# Patient Record
Sex: FEMALE | Race: WHITE
[De-identification: names, ages, dates, MRNs, and addresses within clinical notes are randomized per-mention and may not be internally consistent; named-entity substitution may affect disease eponyms.]

---

## 2019-11-14 ENCOUNTER — HOSPITAL ENCOUNTER (INPATIENT)
Dept: HOSPITAL 74 - JER | Age: 84
LOS: 11 days | Discharge: HOME | DRG: 441 | End: 2019-11-25
Attending: INTERNAL MEDICINE | Admitting: INTERNAL MEDICINE
Payer: COMMERCIAL

## 2019-11-14 VITALS — BODY MASS INDEX: 31.6 KG/M2

## 2019-11-14 DIAGNOSIS — I44.7: ICD-10-CM

## 2019-11-14 DIAGNOSIS — R94.31: ICD-10-CM

## 2019-11-14 DIAGNOSIS — F32.9: ICD-10-CM

## 2019-11-14 DIAGNOSIS — E83.39: ICD-10-CM

## 2019-11-14 DIAGNOSIS — R50.9: ICD-10-CM

## 2019-11-14 DIAGNOSIS — R65.10: ICD-10-CM

## 2019-11-14 DIAGNOSIS — K75.89: Primary | ICD-10-CM

## 2019-11-14 DIAGNOSIS — N17.9: ICD-10-CM

## 2019-11-14 DIAGNOSIS — K76.1: ICD-10-CM

## 2019-11-14 DIAGNOSIS — R74.8: ICD-10-CM

## 2019-11-14 DIAGNOSIS — T43.225A: ICD-10-CM

## 2019-11-14 DIAGNOSIS — I50.21: ICD-10-CM

## 2019-11-14 DIAGNOSIS — E87.2: ICD-10-CM

## 2019-11-14 DIAGNOSIS — K80.20: ICD-10-CM

## 2019-11-14 DIAGNOSIS — R76.8: ICD-10-CM

## 2019-11-14 DIAGNOSIS — E78.5: ICD-10-CM

## 2019-11-14 DIAGNOSIS — I11.0: ICD-10-CM

## 2019-11-14 DIAGNOSIS — B17.9: ICD-10-CM

## 2019-11-14 DIAGNOSIS — I07.1: ICD-10-CM

## 2019-11-14 DIAGNOSIS — S36.118A: ICD-10-CM

## 2019-11-14 DIAGNOSIS — R00.0: ICD-10-CM

## 2019-11-14 DIAGNOSIS — E87.1: ICD-10-CM

## 2019-11-14 DIAGNOSIS — K59.00: ICD-10-CM

## 2019-11-14 DIAGNOSIS — R74.0: ICD-10-CM

## 2019-11-14 LAB
ALBUMIN SERPL-MCNC: 3.5 G/DL (ref 3.4–5)
ALBUMIN SERPL-MCNC: 3.6 G/DL (ref 3.4–5)
ALP SERPL-CCNC: 84 U/L (ref 45–117)
ALP SERPL-CCNC: 86 U/L (ref 45–117)
ALT SERPL-CCNC: > 1000 U/L (ref 13–61)
ANION GAP SERPL CALC-SCNC: 12 MMOL/L (ref 8–16)
ANION GAP SERPL CALC-SCNC: 9 MMOL/L (ref 8–16)
APPEARANCE UR: CLEAR
AST SERPL-CCNC: 3495 U/L (ref 15–37)
AST SERPL-CCNC: 3595 U/L (ref 15–37)
BACTERIA # UR AUTO: 4.3 /HPF
BASOPHILS # BLD: 0.6 % (ref 0–2)
BILIRUB SERPL-MCNC: 1 MG/DL (ref 0.2–1)
BILIRUB SERPL-MCNC: 1.1 MG/DL (ref 0.2–1)
BILIRUB UR STRIP.AUTO-MCNC: NEGATIVE MG/DL
BUN SERPL-MCNC: 13.7 MG/DL (ref 7–18)
BUN SERPL-MCNC: 13.9 MG/DL (ref 7–18)
CALCIUM SERPL-MCNC: 8.8 MG/DL (ref 8.5–10.1)
CALCIUM SERPL-MCNC: 8.9 MG/DL (ref 8.5–10.1)
CASTS URNS QL MICRO: 6 /LPF (ref 0–8)
CHLORIDE SERPL-SCNC: 100 MMOL/L (ref 98–107)
CHLORIDE SERPL-SCNC: 99 MMOL/L (ref 98–107)
CO2 SERPL-SCNC: 21 MMOL/L (ref 21–32)
CO2 SERPL-SCNC: 24 MMOL/L (ref 21–32)
COLOR UR: (no result)
CREAT SERPL-MCNC: 1.2 MG/DL (ref 0.55–1.3)
CREAT SERPL-MCNC: 1.3 MG/DL (ref 0.55–1.3)
DEPRECATED RDW RBC AUTO: 14.3 % (ref 11.6–15.6)
EOSINOPHIL # BLD: 0.1 % (ref 0–4.5)
EPITH CASTS URNS QL MICRO: 5.7 /HPF
GLUCOSE SERPL-MCNC: 271 MG/DL (ref 74–106)
GLUCOSE SERPL-MCNC: 284 MG/DL (ref 74–106)
HCT VFR BLD CALC: 38.9 % (ref 32.4–45.2)
HGB BLD-MCNC: 12.5 GM/DL (ref 10.7–15.3)
INR BLD: 1.69 (ref 0.83–1.09)
KETONES UR QL STRIP: (no result)
LEUKOCYTE ESTERASE UR QL STRIP.AUTO: NEGATIVE
LIPASE SERPL-CCNC: 73 U/L (ref 73–393)
LYMPHOCYTES # BLD: 7.8 % (ref 8–40)
MAGNESIUM SERPL-MCNC: 1.9 MG/DL (ref 1.8–2.4)
MCH RBC QN AUTO: 26.8 PG (ref 25.7–33.7)
MCHC RBC AUTO-ENTMCNC: 32.1 G/DL (ref 32–36)
MCV RBC: 83.7 FL (ref 80–96)
MONOCYTES # BLD AUTO: 5.7 % (ref 3.8–10.2)
NEUTROPHILS # BLD: 85.8 % (ref 42.8–82.8)
NITRITE UR QL STRIP: NEGATIVE
PH UR: 5 [PH] (ref 5–8)
PHOSPHATE SERPL-MCNC: 2.1 MG/DL (ref 2.5–4.9)
PLATELET # BLD AUTO: 284 K/MM3 (ref 134–434)
PMV BLD: 8.4 FL (ref 7.5–11.1)
POTASSIUM SERPLBLD-SCNC: 4.2 MMOL/L (ref 3.5–5.1)
POTASSIUM SERPLBLD-SCNC: 4.8 MMOL/L (ref 3.5–5.1)
PROT SERPL-MCNC: 6.8 G/DL (ref 6.4–8.2)
PROT SERPL-MCNC: 7.1 G/DL (ref 6.4–8.2)
PROT UR QL STRIP: (no result)
PROT UR QL STRIP: (no result)
PT PNL PPP: 20 SEC (ref 9.7–13)
RBC # BLD AUTO: 2 /HPF (ref 0–4)
RBC # BLD AUTO: 4.65 M/MM3 (ref 3.6–5.2)
SODIUM SERPL-SCNC: 133 MMOL/L (ref 136–145)
SODIUM SERPL-SCNC: 133 MMOL/L (ref 136–145)
SP GR UR: 1.03 (ref 1.01–1.03)
UROBILINOGEN UR STRIP-MCNC: 1 MG/DL (ref 0.2–1)
WBC # BLD AUTO: 12.2 K/MM3 (ref 4–10)
WBC # UR AUTO: 2 /HPF (ref 0–5)

## 2019-11-14 PROCEDURE — A9502 TC99M TETROFOSMIN: HCPCS

## 2019-11-14 PROCEDURE — C1887 CATHETER, GUIDING: HCPCS

## 2019-11-14 NOTE — CON.GI
Consult


Consult Specialty:: GI: For Dr. Dupree who resumes care 11/15


Referred by:: Dr. Feliz Verde


Reason for Consultation:: Abnormal LFTs





- History of Present Illness


Chief Complaint: Weakness for 2 weeks and increasing HUTSON


History of Present Illness: 





88F admitted through [facility] ER for evaluation of weakness and SOB.  This has been 

occurring over the last 2 weeks and accompanied by increasing HUTSON. She was 

given amoxicillin monday (took 3-4 pills since that time) thinking that this 

may be infection related and may have taken two tylenol pills daily for 2-3 

days for attempt at symptomatic relief this week.  In the ED, transaminases 

noted to be markedly elevated. CXR revealed pleural effusions.  She denied 

associated abdominal pain, diarrhea, change in dietary habits, sick contacts, 

headache, change in vision, fevers/chills, nausea/vomiting, food fear, travel.  

Her daughter explains that her mother has done some yard work with soil that 

had crushed up moth balls added to it.  There has been no known animal 

exposures.   





- History Source


History Provided By: Patient, Family Member, Medical Record


Limitations to Obtaining History: No Limitations





- Past Medical History


Cardio/Vascular: Yes: HTN, Hyperlipdemia





- Past Surgical History


Past Surgical History: Yes: Hysterectomy (ROXANA/BSO)





- Alcohol/Substance Use


Hx Alcohol Use: No


History of Substance Use: reports: None





- Smoking History


Smoking history: Never smoked


Have you smoked in the past 12 months: No





- Social History


Usual Living Arrangement: With Child


ADL: Independent


Occupation: Retired 


Place of Birth: [place]


History of Recent Travel: No





Home Medications





- Allergies


Allergies/Adverse Reactions: 


 Allergies











Allergy/AdvReac Type Severity Reaction Status Date / Time


 


metoclopramide [From Reglan] Allergy   Verified 19 11:40


 


prochlorperazine Allergy   Verified 19 11:39





[From Compazine]     














- Home Medications


Home Medications: 


Ambulatory Orders





Amoxicillin - [Amoxicillin 500mg Capsule -] 500 mg PO TID 19 


Aspirin [Children's Aspirin] 81 mg PO DAILY 19 


Fenofibrate Nanocrystallized [Fenofibrate] 145 mg PO DAILY 19 


Losartan Potassium 50 mg PO DAILY 19 


Lovastatin 20 mg PO DAILY 19 


Paroxetine HCl 40 mg PO DAILY 19 


Zolpidem Tartrate 10 mg PO DAILY 19 











Family Medical History


Other Family History: Father: : 70's: Asthma.  Mother: : 70's: "heart 

problems".  2 brothers: : unclear causes.  2 daughters: healthy.  No family 

history of colorectal cancer or other GI malignancy.  No family history of 

liver disease





Review of Systems





- Review of Systems


Constitutional: reports: Weakness.  denies: Chills, Unintentional Wgt. Loss


Cardiovascular: denies: Chest Pain


Respiratory: reports: SOB, SOB on Exertion


Gastrointestinal: denies: Abdominal Pain, Bloating, Diarrhea, Melena, Rectal 

Bleeding





Physical Exam-GI


Vital Signs: 


 Vital Signs











Temperature  100.6 F H  19 17:00


 


Pulse Rate  98 H  19 17:00


 


Respiratory Rate  22 H  19 17:00


 


Blood Pressure  147/90   19 17:00


 


O2 Sat by Pulse Oximetry (%)  99   19 17:00











Constitutional: Yes: Calm


Eyes: No: Sclera Icterus


Cardiovascular: Yes: Regular Rate and Rhythm, Gallop, S3


Edema: LLE: Trace


Neurological: Yes: Alert, Oriented.  No: Asterixis


Labs: 


 CBC, BMP





 19 12:50 





 19 14:20 





 INR, PTT











INR  1.69  (0.83-1.09)  H  19  14:32    








 Hepatic Panel











Total Bilirubin  1.0 mg/dL (0.2-1)   19  14:20    


 


AST  3495 U/L (15-37)  H  19  14:20    


 


ALT  2170 U/L (13-61)  H  19  14:20    


 


Alkaline Phosphatase  84 U/L ()   19  14:20    


 


Albumin  3.5 g/dl (3.4-5.0)   19  14:20    








 Laboratory Tests











  19





  14:32


 


INR  1.69 H








 Laboratory Tests











  19





  14:10


 


Acetaminophen  <2.0














Imaging





- Results


Ultrasound: Report Reviewed (cholelithiasis, no biliary tract dilatation, 

complex splenic cystic structure with calcifications, normal flow hepatic veins 

and main portal vein.)





Problem List





- Problems


(1) Hepatitis


Assessment/Plan: 


Acute hepatitis with marked liver chemistries of unclear etiology.  History not 

suggestive of significant tylenol ingestion and doppler study unrevealing


? DILI, ? Autoimmune process. Infectious etiology not excluded 


Acute hepatitis serologies are pending


Hepatitis A/B panel ordered or AM


HARISH / ASMA ordered for AM as well as repeat coags and liver chemistries


If continued rise in liver chemistries, development of encephalopathy, advise 

transfer to liver center for further evaluation and where liver biopsy can be 

performed with expeditious reading.  


MRI/MRCP ordered for further evaluation of biliary tract and splenic finding.


ID Consult


Code(s): K75.9 - INFLAMMATORY LIVER DISEASE, UNSPECIFIED

## 2019-11-14 NOTE — PN
Teaching Attending Note


Name of Resident: Pushpa Franks





ATTENDING PHYSICIAN STATEMENT





I saw and evaluated the patient.


I reviewed the resident's note and discussed the case with the resident.


I agree with the resident's findings and plan as documented.








SUBJECTIVE:


89yo woman with HTN, HLD, ROXANA multiple decades ago, depression, Poor historian 

complained of About 1 week of subjective fever and chills.  In the emergency 

room patient was complaining of some increasing shortness of breath. Took about 

3 tablets of amoxicillin since this past Monday, was prescribed it by her PCP 

for possible URI. Transaminases noted to be elevated. Had fever in the ER. 

Denied any sick contacts or recent travels.








OBJECTIVE:


 Last Vital Signs











Temp Pulse Resp BP Pulse Ox


 


 97.9 F   95 H  18   123/87   98 


 


 11/14/19 19:25  11/14/19 19:25  11/14/19 19:25  11/14/19 19:25  11/14/19 19:25





GENERAL:


Well developed, well nourished. Awake and alert. No acute distress.


HEENT:


Normocephalic, atraumatic. PERRLA, EOMI. No conjunctival pallor. Sclera are non-

icteric. Moist mucous membranes. 


NECK: 


Supple. Full ROM. No JVD. Carotid pulses 2+ and symmetric, without bruits. No 

thyromegaly. No lymphadenopathy.


CARDIOVASCULAR:


Regular rate and rhythm. No murmurs, rubs, or gallops. Distal pulses are 2+ and 

symmetric. 


PULMONARY: 


No evidence of respiratory distress. Lungs clear to auscultation bilaterally. 

No wheezing, rales or rhonchi.


ABDOMINAL:


Soft. Non-tender. Vertical scar noted Non-distended. No rebound or guarding. No 

organomegaly. Normoactive bowel sounds. 


MUSCULOSKELETAL 


Normal range of motion at all joints. No bony deformities or tenderness. No CVA 

tenderness.


EXTREMITIES: 


No cyanosis. No clubbing. No edema. No calf tenderness.


SKIN: 


Warm and dry. Normal capillary refill. No rashes. No jaundice. 


PSYCHIATRIC: 


Cooperative. Good eye contact. Appropriate mood and affect.





 Abnormal Lab Results











  11/14/19 11/14/19 11/14/19





  12:50 12:50 12:50


 


WBC  12.2 H  


 


Absolute Neuts (auto)  10.4 H  


 


Neutrophils %  85.8 H  


 


Lymphocytes %  7.8 L  


 


PT with INR   


 


INR   


 


Sodium   133 L 


 


Random Glucose   284 H 


 


Lactic Acid   


 


Phosphorus   


 


Total Bilirubin   1.1 H 


 


AST   3595 H 


 


ALT   > 1000 H 


 


B-Natriuretic Peptide    6499.0 H


 


Urine Protein   


 


Urine Glucose (UA)   


 


Urine Ketones   














  11/14/19 11/14/19 11/14/19





  12:50 12:50 14:20


 


WBC   


 


Absolute Neuts (auto)   


 


Neutrophils %   


 


Lymphocytes %   


 


PT with INR   


 


INR   


 


Sodium    133 L


 


Random Glucose    271 H


 


Lactic Acid   


 


Phosphorus  2.1 L  


 


Total Bilirubin   


 


AST    3495 H


 


ALT    2170 H


 


B-Natriuretic Peptide   


 


Urine Protein   2+ H 


 


Urine Glucose (UA)   3+ H 


 


Urine Ketones   Trace H 














  11/14/19 11/14/19 11/14/19





  14:32 14:39 19:57


 


WBC   


 


Absolute Neuts (auto)   


 


Neutrophils %   


 


Lymphocytes %   


 


PT with INR  20.00 H  


 


INR  1.69 H  


 


Sodium   


 


Random Glucose   


 


Lactic Acid   4.1 H* 


 


Phosphorus    2.2 L


 


Total Bilirubin   


 


AST   


 


ALT   


 


B-Natriuretic Peptide   


 


Urine Protein   


 


Urine Glucose (UA)   


 


Urine Ketones   








Imaging reviewed





ASSESSMENT AND PLAN:


#Transaminitis-May be drug-induced liver injury, possibly secondary to 

amoxicillin. Noted to have significant lactic acidosis of 4.1 on initial 

presentation.


Admit to MedSurg


IV fluid hydration


Repeat lactate


Avoid hepatotoxins


Tylenol level


EtOH level


Urine toxicology


Viral hepatitis panelshep A, hep B, C


GI was consulted from the emergency room


Trend liver function tests


Avoid amoxicillin at this time as it may have induced drug induced liver injury


#One-time fever of 100.6 Fahrenheit, tachycardia and high leukocytosis, Lactic 

acidosis, While on amoxicillin suggestive of underlying infectious process 

however no source of infection identified at this time. Suspect possible upper 

respiratory infection. Lactic acidosis has improved.


Send blood cultures, urine culture


Monitor off antibiotics at this time


#DVT prophylaxisheparin subcutaneously

## 2019-11-14 NOTE — EKG
Test Reason : 

Blood Pressure : ***/*** mmHG

Vent. Rate : 110 BPM     Atrial Rate : 110 BPM

   P-R Int : 142 ms          QRS Dur : 138 ms

    QT Int : 404 ms       P-R-T Axes : 064 035 190 degrees

   QTc Int : 546 ms

 

SINUS TACHYCARDIA WITH PREMATURE ATRIAL COMPLEXES

NON-SPECIFIC INTRA-VENTRICULAR CONDUCTION BLOCK

CANNOT RULE OUT ANTERIOR INFARCT , AGE UNDETERMINED

ABNORMAL ECG

WHEN COMPARED WITH ECG OF 27-JUN-2007 18:20,

PREMATURE ATRIAL COMPLEXES ARE NOW PRESENT

NONSPECIFIC T WAVE ABNORMALITY NOW EVIDENT IN INFERIOR LEADS

T WAVE INVERSION MORE EVIDENT IN LATERAL LEADS

Confirmed by GLORIA ROJAS, EMANUEL ([id number]) on 11/14/2019 2:46:36 PM

 

Referred By:             Confirmed By:EMANUEL CASTRO MD

## 2019-11-14 NOTE — HP
CHIEF COMPLAINT: SOB and feverish





PCP: Dr. Verde





HISTORY OF PRESENT ILLNESS:





Ms. Bueno is an 89 y/o lady with a past medical history of HTN and HLD who 

presents to the ED complaining of weak and "belaboured" breathing accompanied 

by diaphoresis, fever, generalized malaise and weakness. Per the patient's 

daughter at the bedside, her mother started feeling bad last Friday when she 

was complaining of a sore throat, her symptoms continued over the weekend and 

she developed chills, fever, and decreased appetite. On Monday they called her 

PCP, who prescribed Amoxicillin 500 TID which the pt began to take. The patient 

took 3-4 doses between Monday and Thursday because she felt they were not 

helping much and making her feel worse. Today the patient's second daughter, 

who is a nurse, came down to see the patient and felt that she didn't look 

well. Although the patient had an upcomming appointment to see her PCP, they 

decided to bring her into the ED because they were concerned about her 

difficulty breathing and diaphoresis. On ROS the pt endorsed weakness, 

diaphoresis, chills, SOB, and feeling febrile. She denies CP, heart palpitations

, nausea, vomiting, abdominal pain, diarrhea, constipations, numbness/ tingling 

in the arms/ legs or swelling in any of her extremities. Per the daughter her 

mother was not started on any new medications recently except for the 

amoxicilin and she had not been taking excessive amounts of tylenol or NSAIDS. 

The only new "exposure" the pt has had is to some crushed mothballs used in 

gardening around their house. Pt denies having sick contacts but the pt's 

daughter states she also had flu like sx around the time when her mother's sx 

began. 








ER course was notable for:


(1) EKG sinus tachycardia at 110 bpm, QTc of 546, LBBB


(2) ALT 2170, AST 3495, acetaminophen <2. BNP elevated to 6499, INR 1.69, 

Lactate 4.1


(3) given 500cc bolus of NS and GI, Dr. Jameson, was consulted








Recent Travel: denies





PAST MEDICAL HISTORY: HTN and HLD 





PAST SURGICAL HISTORY: s/p R ORIF of a humeral fracture, total abdominal 

hysterectomy





Social History:


Smoking: denies


Alcohol: on special occasions


Drugs: denies





Allergies





metoclopramide [From Reglan] Allergy (Verified 11/14/19 11:40)


 


prochlorperazine [From Compazine] Allergy (Verified 11/14/19 11:39)


 








HOME MEDICATIONS:


 Home Medications











 Medication  Instructions  Recorded


 


Amoxicillin - [Amoxicillin 500mg 500 mg PO TID 11/14/19





Capsule -]  


 


Aspirin [Children's Aspirin] 81 mg PO DAILY 11/14/19


 


Fenofibrate Nanocrystallized 145 mg PO DAILY 11/14/19





[Fenofibrate]  


 


Losartan Potassium 50 mg PO DAILY 11/14/19


 


Lovastatin 20 mg PO DAILY 11/14/19


 


Paroxetine HCl 40 mg PO DAILY 11/14/19


 


Zolpidem Tartrate 10 mg PO DAILY 11/14/19








REVIEW OF SYSTEMS


CONSTITUTIONAL: fever, chills, diaphoresis, generalized weakness, malaise, loss 

of appetite, 


Absent:  weight change


HEENT: throat pain, 


Absent:  rhinorrhea, nasal congestion, throat swelling, difficulty swallowing, 

mouth swelling, ear pain, eye pain, visual changes


CARDIOVASCULAR: 


Absent: chest pain, syncope, palpitations, irregular heart rate, lightheadedness

, peripheral edema


RESPIRATORY: shortness of breath, dyspnea with exertion (chronic, gets some 

dyspnea going up stairs)


Absent: cough,  orthopnea, wheezing, stridor, hemoptysis


GASTROINTESTINAL:


Absent: abdominal pain, abdominal distension, nausea, vomiting, diarrhea, 

constipation, melena, hematochezia


GENITOURINARY: 


Absent: dysuria, frequency, urgency, hesitancy, hematuria, flank pain, genital 

pain


MUSCULOSKELETAL: 


Absent: myalgia, arthralgia, joint swelling, back pain, neck pain


SKIN: 


Absent: rash, itching, pallor


HEMATOLOGIC/IMMUNOLOGIC: 


Absent: easy bleeding, easy bruising, lymphadenopathy, frequent infections


ENDOCRINE:


Absent: unexplained weight gain, unexplained weight loss, heat intolerance, 

cold intolerance


NEUROLOGIC: 


Absent: headache, focal weakness or paresthesias, dizziness, unsteady gait, 

seizure, mental status changes, bladder or bowel incontinence


PSYCHIATRIC: 


Absent: anxiety, depression, suicidal or homicidal ideation, hallucinations.








PHYSICAL EXAMINATION


 Vital Signs - 24 hr











  11/14/19 11/14/19 11/14/19





  11:25 11:45 13:00


 


Temperature 99.5 F  


 


Pulse Rate 109 H  


 


Pulse Rate [   109 H





Right Radial]   


 


Respiratory 16  20





Rate   


 


Blood Pressure 159/82  


 


Blood Pressure   151/85





[Left Arm]   


 


O2 Sat by Pulse 96 96 96





Oximetry (%)   














  11/14/19 11/14/19 11/14/19





  17:00 18:54 19:25


 


Temperature 100.6 F H 98.7 F 97.9 F


 


Pulse Rate   


 


Pulse Rate [ 98 H 105 H 95 H





Right Radial]   


 


Respiratory 22 H 20 18





Rate   


 


Blood Pressure   


 


Blood Pressure 147/90 144/75 123/87





[Left Arm]   


 


O2 Sat by Pulse 99 100 98





Oximetry (%)   














  11/14/19





  21:42


 


Temperature 97.9 F


 


Pulse Rate 96 H


 


Pulse Rate [ 





Right Radial] 


 


Respiratory 18





Rate 


 


Blood Pressure 146/85


 


Blood Pressure 





[Left Arm] 


 


O2 Sat by Pulse 97





Oximetry (%) 











GENERAL: Awake, alert, and fully oriented, in no acute distress. Diaphoretic 

and laying in bed without sheets/ socks or anything covering her because she 

states she feels hot though she is afebrile and her skin feels cool and clammy. 


HEAD: Normal with no signs of trauma.


EYES: Pupils equal, round and reactive to light, extraocular movements intact, 

sclera anicteric, conjunctiva clear. No lid lag.


EARS, NOSE, THROAT: Ears normal, nares patent, oropharynx clear without 

exudates. Dry mucous membranes.


NECK: Normal range of motion, supple without lymphadenopathy, mild elevation in 

JVD.


LUNGS: Breath sounds equal, clear to auscultation bilaterally. No wheezes, and 

no crackles. No accessory muscle use.


HEART: Tachycardic, regular rhythm, S1 and S2 no murmur, rub or gallop noted. 


ABDOMEN: Soft, nontender, not distended, vertical scar noted, normoactive bowel 

sounds, no guarding, no rebound, no masses.  No hepatomegaly or  splenomegaly. 


MUSCULOSKELETAL: Normal range of motion at all joints. No bony deformities or 

tenderness. No CVA tenderness.


UPPER EXTREMITIES: 2+ pulses, warm, well-perfused. No cyanosis. No clubbing. No 

peripheral edema.


LOWER EXTREMITIES: 2+ pulses, warm, well-perfused. No calf tenderness. Trace 

peripheral edema. 


NEUROLOGICAL:  Cranial nerves II-XII intact. Normal speech. 


PSYCHIATRIC: Cooperative. Good eye contact. Appropriate mood and affect.


SKIN: Cool, clammy, normal turgor, no rashes or lesions noted, normal capillary 

refill. 


 Laboratory Results - last 24 hr











  11/14/19 11/14/19 11/14/19





  12:50 12:50 12:50


 


WBC  12.2 H  


 


RBC  4.65  


 


Hgb  12.5  


 


Hct  38.9  


 


MCV  83.7  


 


MCH  26.8  


 


MCHC  32.1  


 


RDW  14.3  


 


Plt Count  284  


 


MPV  8.4  


 


Absolute Neuts (auto)  10.4 H  


 


Neutrophils %  85.8 H  


 


Lymphocytes %  7.8 L  


 


Monocytes %  5.7  


 


Eosinophils %  0.1  


 


Basophils %  0.6  


 


Nucleated RBC %  0  


 


PT with INR   


 


INR   


 


PTT (Actin FS)   


 


Sodium   133 L 


 


Potassium   4.2 


 


Chloride   99 


 


Carbon Dioxide   21 


 


Anion Gap   12 


 


BUN   13.9 


 


Creatinine   1.3 


 


Est GFR (CKD-EPI)AfAm   42.42 


 


Est GFR (CKD-EPI)NonAf   36.60 


 


Random Glucose   284 H 


 


Lactic Acid   


 


Calcium   8.9 


 


Phosphorus   


 


Magnesium   


 


Total Bilirubin   1.1 H 


 


AST   3595 H 


 


ALT   > 1000 H 


 


Alkaline Phosphatase   86 


 


Creatine Kinase   


 


Creatine Kinase Index   


 


CK-MB (CK-2)   


 


Troponin I   


 


B-Natriuretic Peptide    6499.0 H


 


Total Protein   7.1 


 


Albumin   3.6 


 


Lipase   


 


Urine Color   


 


Urine Appearance   


 


Urine pH   


 


Ur Specific Gravity   


 


Urine Protein   


 


Urine Glucose (UA)   


 


Urine Ketones   


 


Urine Blood   


 


Urine Nitrite   


 


Urine Bilirubin   


 


Urine Urobilinogen   


 


Ur Leukocyte Esterase   


 


Urine WBC (Auto)   


 


Urine RBC (Auto)   


 


Urine Casts (Auto)   


 


U Epithel Cells (Auto)   


 


Urine Bacteria (Auto)   


 


Acetaminophen   














  11/14/19 11/14/19 11/14/19





  12:50 12:50 14:10


 


WBC   


 


RBC   


 


Hgb   


 


Hct   


 


MCV   


 


MCH   


 


MCHC   


 


RDW   


 


Plt Count   


 


MPV   


 


Absolute Neuts (auto)   


 


Neutrophils %   


 


Lymphocytes %   


 


Monocytes %   


 


Eosinophils %   


 


Basophils %   


 


Nucleated RBC %   


 


PT with INR   


 


INR   


 


PTT (Actin FS)   


 


Sodium   


 


Potassium   


 


Chloride   


 


Carbon Dioxide   


 


Anion Gap   


 


BUN   


 


Creatinine   


 


Est GFR (CKD-EPI)AfAm   


 


Est GFR (CKD-EPI)NonAf   


 


Random Glucose   


 


Lactic Acid   


 


Calcium   


 


Phosphorus  2.1 L  


 


Magnesium  1.9  


 


Total Bilirubin   


 


AST   


 


ALT   


 


Alkaline Phosphatase   


 


Creatine Kinase   


 


Creatine Kinase Index   


 


CK-MB (CK-2)   


 


Troponin I  0.04  


 


B-Natriuretic Peptide   


 


Total Protein   


 


Albumin   


 


Lipase   


 


Urine Color   Dk yellow 


 


Urine Appearance   Clear 


 


Urine pH   5.0 


 


Ur Specific Gravity   1.026 


 


Urine Protein   2+ H 


 


Urine Glucose (UA)   3+ H 


 


Urine Ketones   Trace H 


 


Urine Blood   Negative 


 


Urine Nitrite   Negative 


 


Urine Bilirubin   Negative 


 


Urine Urobilinogen   1.0 


 


Ur Leukocyte Esterase   Negative 


 


Urine WBC (Auto)   2 


 


Urine RBC (Auto)   2 


 


Urine Casts (Auto)   6 


 


U Epithel Cells (Auto)   5.7 


 


Urine Bacteria (Auto)   4.3 


 


Acetaminophen    <2.0














  11/14/19 11/14/19 11/14/19





  14:20 14:32 14:39


 


WBC   


 


RBC   


 


Hgb   


 


Hct   


 


MCV   


 


MCH   


 


MCHC   


 


RDW   


 


Plt Count   


 


MPV   


 


Absolute Neuts (auto)   


 


Neutrophils %   


 


Lymphocytes %   


 


Monocytes %   


 


Eosinophils %   


 


Basophils %   


 


Nucleated RBC %   


 


PT with INR   20.00 H 


 


INR   1.69 H 


 


PTT (Actin FS)    27.2


 


Sodium  133 L  


 


Potassium  4.8  


 


Chloride  100  


 


Carbon Dioxide  24  


 


Anion Gap  9  


 


BUN  13.7  


 


Creatinine  1.2  


 


Est GFR (CKD-EPI)AfAm  46.73  


 


Est GFR (CKD-EPI)NonAf  40.32  


 


Random Glucose  271 H  


 


Lactic Acid   


 


Calcium  8.8  


 


Phosphorus   


 


Magnesium   


 


Total Bilirubin  1.0  


 


AST  3495 H  


 


ALT  2170 H  


 


Alkaline Phosphatase  84  


 


Creatine Kinase  158  


 


Creatine Kinase Index  1.5  


 


CK-MB (CK-2)  2.5  


 


Troponin I  0.04  


 


B-Natriuretic Peptide   


 


Total Protein  6.8  


 


Albumin  3.5  


 


Lipase  73  


 


Urine Color   


 


Urine Appearance   


 


Urine pH   


 


Ur Specific Gravity   


 


Urine Protein   


 


Urine Glucose (UA)   


 


Urine Ketones   


 


Urine Blood   


 


Urine Nitrite   


 


Urine Bilirubin   


 


Urine Urobilinogen   


 


Ur Leukocyte Esterase   


 


Urine WBC (Auto)   


 


Urine RBC (Auto)   


 


Urine Casts (Auto)   


 


U Epithel Cells (Auto)   


 


Urine Bacteria (Auto)   


 


Acetaminophen   














  11/14/19 11/14/19 11/14/19





  14:39 19:57 21:35


 


WBC   


 


RBC   


 


Hgb   


 


Hct   


 


MCV   


 


MCH   


 


MCHC   


 


RDW   


 


Plt Count   


 


MPV   


 


Absolute Neuts (auto)   


 


Neutrophils %   


 


Lymphocytes %   


 


Monocytes %   


 


Eosinophils %   


 


Basophils %   


 


Nucleated RBC %   


 


PT with INR   


 


INR   


 


PTT (Actin FS)   


 


Sodium   


 


Potassium   


 


Chloride   


 


Carbon Dioxide   


 


Anion Gap   


 


BUN   


 


Creatinine   


 


Est GFR (CKD-EPI)AfAm   


 


Est GFR (CKD-EPI)NonAf   


 


Random Glucose   


 


Lactic Acid  4.1 H*   4.2 H*


 


Calcium   


 


Phosphorus   2.2 L 


 


Magnesium   


 


Total Bilirubin   


 


AST   


 


ALT   


 


Alkaline Phosphatase   


 


Creatine Kinase   


 


Creatine Kinase Index   


 


CK-MB (CK-2)   


 


Troponin I   


 


B-Natriuretic Peptide   


 


Total Protein   


 


Albumin   


 


Lipase   


 


Urine Color   


 


Urine Appearance   


 


Urine pH   


 


Ur Specific Gravity   


 


Urine Protein   


 


Urine Glucose (UA)   


 


Urine Ketones   


 


Urine Blood   


 


Urine Nitrite   


 


Urine Bilirubin   


 


Urine Urobilinogen   


 


Ur Leukocyte Esterase   


 


Urine WBC (Auto)   


 


Urine RBC (Auto)   


 


Urine Casts (Auto)   


 


U Epithel Cells (Auto)   


 


Urine Bacteria (Auto)   


 


Acetaminophen   











Abdomen US: Cholelithiasis is noted without sonographic evidence of acute 

cholecystitis. There is no definite biliary tract dilatation. Possible diffuse 

hepatic steatosis. A complex 2.6 x 3.4 x 3 cm splenic cystic structure is seen 

with several areas of peripheral calcification. 3 month follow-up sonography or 

CT is suggested to document stability. There is partial imaging of bilateral 

pleural effusions. These pleural effusions could not be appreciated on portable 

radiography performed 3 hours earlier - ? pulmonary vascular congestion.








ASSESSMENT/PLAN:


Ms. Bueno is an 89 y/o lady with a past medical history of HTN and HLD who 

presents to the ED complaining of weak and "belaboured" breathing accompanied 

by diaphoresis, fever, generalized malaise and weakness. 





# Transaminitis- Per pt she did not take much tylenol or NSAIDs, and the only 

new medication she has taken was the amoxicillin. On exam the pt was found to 

have transaminitis without grossly abnormal hepatobiliary imaging. It's 

possibly her transaminitis is 2/2 drug-induced liver injury, possibly 2/2 

amoxicillin as in all major studies on DILI antibiotics are the most common 

type of drugs that are reported. Statins are also known to be implicated in 

cases of DILI, given the patient's history and exam findings this is a 

plausible explination but other causes of acute liver injury should be ruled 

out including viral hepatitis or autoimmune disorders. Noted to have 

significant lactic acidosis of 4.1 on initial presentation.


- gentle fluid hydration with IVNS@75cc


- Trend lactate


- Avoid hepatotoxins


- EtOH level


- Utox


- viral hepatitis panel (Hep A, B, C)


- ID consulted, Dr. Valdez, appreciate recommendations


- ED consulted GI, Dr. Jameson, appreciate recommendations


   - MRI/MRCP ordered for further evaluation of biliary tract and splenic 

finding.


   - HARISH / ASMA ordered 


   - f/i repeat coags and liver chemistries


   - If continued rise in liver chemistries, development of encephalopathy, 

advise transfer to liver center for further evaluation and where liver biopsy 

can be performed with expeditious reading.  


- Trend LFTs


- Avoid amoxicillin as this may have precipitated a DILI


- Echo pending to assess cardiac fn





# Fever - Febrile to 100.6 once, pt is also tachycardic and has leukocytosis 

and lactic acidosis which could be suggestive of infectious process, however 

source is yet to be identified. 


- blood and urine cx pending


- LA 4.1> 4.2, continue to trend lactate


- monitoring off abx at this time





# FEN


- IV NS @ 75cc/h 


- replete PRN


- NPO for now pending improvement in pt's clinical status





# PPX


- DVT ppx- Heparin 5000u TID





# Dispo- admit to [facility], full code





Visit type





- Emergency Visit


Emergency Visit: Yes


ED Registration Date: 11/14/19


Care time: The patient presented to the Emergency Department on the above date 

and was hospitalized for further evaluation of their emergent condition.





- New Patient


This patient is new to me today: Yes


Date on this admission: 11/15/19





- Critical Care


Critical Care patient: No





ATTENDING PHYSICIAN STATEMENT





I saw and evaluated the patient.


I reviewed the resident's note and discussed the case with the resident.


I agree with the resident's findings and plan as documented.








SUBJECTIVE:








OBJECTIVE:








ASSESSMENT AND PLAN:

## 2019-11-14 NOTE — PDOC
History of Present Illness





- General


Chief Complaint: Shortness of Breath


Stated Complaint: Shortness of Breath


Time Seen by Provider: 11/14/19 12:00





- History of Present Illness


Initial Comments: 


11/14/19 12:36


89 yo F PMH HTN, HLD, ROXANA multiple decades ago, depression, presenting with 

fevers and SOB. Symptoms began approximately 4 days ago with fevers/chills, 

unproductive cough, SOB with exertion but not at rest, generalized malaise, and 

profuse sweating. Patient lives with her daughter who has also had the same 

issues, albeit less intense. Patient has eaten only soups and drank fluids 

since onset of symptoms. 3 days ago, patient contacted their primary care doctor

, who sent them amoxicillin 500mg TID. However, the patient reports not taking 

this consistently 2/2 thinking it was making her feel worse.





Specifically denies recent travel, CP, abd pain, constipation/diarrhea, urinary 

changes, dark stools, or rectal bleeding. Endorses sick contact, unproductive 

cough, SOB with exertion, malaise.








Past History





- Past Medical History


Allergies/Adverse Reactions: 


 Allergies











Allergy/AdvReac Type Severity Reaction Status Date / Time


 


metoclopramide [From Reglan] Allergy   Verified 11/14/19 11:40


 


prochlorperazine Allergy   Verified 11/14/19 11:39





[From Compazine]     











Home Medications: 


Ambulatory Orders





Amoxicillin - [Amoxicillin 500mg Capsule -] 500 mg PO TID 11/14/19 


Aspirin [Children's Aspirin] 81 mg PO DAILY 11/14/19 


Fenofibrate Nanocrystallized [Fenofibrate] 145 mg PO DAILY 11/14/19 


Losartan Potassium 50 mg PO DAILY 11/14/19 


Lovastatin 20 mg PO DAILY 11/14/19 


Paroxetine HCl 40 mg PO DAILY 11/14/19 


Zolpidem Tartrate 10 mg PO DAILY 11/14/19 











- Psycho Social/Smoking Cessation Hx


Smoking History: Never smoked


Have you smoked in the past 12 months: No


Information on smoking cessation initiated: No


Hx Alcohol Use: No


Drug/Substance Use Hx: No





**Review of Systems





- Review of Systems


Comments:: 


11/14/19 12:46


GENERAL/CONSTITUTIONAL: Fevers and chills. Generalized malaise.


HEAD, EYES, EARS, NOSE AND THROAT: No change in vision. No ear pain or 

discharge. No sore throat.


CARDIOVASCULAR: No chest pain, however endorses SOB with exertion.


RESPIRATORY: Endorses non-productive cough without wheezing or hemoptysis.


GASTROINTESTINAL: No nausea, vomiting, diarrhea or constipation. No dark stools 

or rectal bleeding.


GENITOURINARY: No dysuria, frequency, or change in urination.


MUSCULOSKELETAL: No joint or muscle swelling or pain. No neck or back pain.


SKIN: No rash


NEUROLOGIC: No headache, vertigo, loss of consciousness, or change in strength/

sensation.


ENDOCRINE: No increased thirst. No abnormal weight change.


HEMATOLOGIC/LYMPHATIC: No anemia, easy bleeding, or history of blood clots.


ALLERGIC/IMMUNOLOGIC: No hives or skin allergy








*Physical Exam





- Vital Signs


 Last Vital Signs











Temp Pulse Resp BP Pulse Ox


 


 99.5 F   109 H  16   159/82   96 


 


 11/14/19 11:25  11/14/19 11:25  11/14/19 11:25  11/14/19 11:25  11/14/19 11:25














- Physical Exam


Comments: 


11/14/19 12:53


Gen: well-developed, well-nourished, NAD


Neuro: AAOX4, CN II-XII intact, FTN intact, EOMI, PERRLA, 5/5 strength, SILT


HEENT: atraumatic, normocephalic, dry mucous membranes


Neck: trachea midline, supple


CV: tachycardic, regular rhythm, no murmurs, rubs, or gallops


Pulm: diffuse coarse breath sounds, no wheezing


Abd: soft, non-distended, non-tender, well-healed surgical scar


MSK: full ROM, intact pulses 


Extr: no edema, no deformities


Skin: warm, dry








ED Treatment Course





- LABORATORY


CBC & Chemistry Diagram: 


 11/16/19 06:20





 11/16/19 06:20





- RADIOLOGY


Radiology Studies Ordered: 














 Category Date Time Status


 


 CXRPORT [CHEST X-RAY PORTABLE*] [RAD] Stat Radiology  11/14/19 12:27 Ordered














Medical Decision Making





- Medical Decision Making


11/14/19 12:56


Concern for PNA vs new-onset CHF exacerbation.


- CBC, CMP, BNP


- EKG, trop, CXR


- blood cultures


- UA/UC


- reassess





11/14/19 13:12


EKG sinus tachycardia at 110 bpm, QTc of 546, LBBB





11/14/19 13:26


CXR: patient appears to be s/p R ORIF of a humeral fracture. No apparent 

consolidation, effusion, or fluid overload.





11/14/19 14:37


ALT >1000, AST 3595. Will get repeat CMP, acute hepatitis panel, coags, lipase. 

Add-on acetaminophen <2. BNP elevated to 6499, patient appears to have new 

onset CHF.





11/14/19 16:36


INR 1.69, Lactate 4.1. Will give 500cc bolus of NS.





11/14/19 18:40


Abdomen US: Cholelithiasis is noted without sonographic evidence of acute 

cholecystitis. There is no definite biliary tract dilatation. Possible diffuse 

hepatic steatosis. A complex 2.6 x 3.4 x 3 cm splenic cystic structure is seen 

with several areas of peripheral calcification. 3 month follow-up sonography or 

CT is suggested to document stability. There is partial imaging of bilateral 

pleural effusions. These pleural effusions could not be appreciated on portable 

radiography performed 3 hours earlier - ? pulmonary vascular congestion.\





Will admit.








Discharge





- Discharge Information


Problems reviewed: Yes


Clinical Impression/Diagnosis: 


 Transaminitis, New onset of congestive heart failure








- Follow up/Referral





- Patient Discharge Instructions





- Post Discharge Activity

## 2019-11-14 NOTE — PDOC
Documentation entered by Jerald Mejia SCRIBE, acting as scribe for Duke Lopez MD.








Duke Lopez MD:  This documentation has been prepared by the Jackie vences Nirvannie, SCRIBE, under my direction and personally reviewed by me in its 

entirety.  I confirm that the documentation accurately reflects all work, 

treatment, procedures, and medical decision making performed by me.  





Attending Attestation





- Resident


Resident Name: PickensManetian





- ED Attending Attestation


I have performed the following: I have examined & evaluated the patient, The 

case was reviewed & discussed with the resident, I agree w/resident's findings 

& plan, Exceptions are as noted





- HPI


HPI: 





11/14/19 13:30


The patient is a 88 year old female, with a significant past medical history of 

HTN, HLD, and depression, who presents to the emergency department with 4 days 

of fever with chills, nonproductive cough with exertional shortness of breath. 

Patient was prescribed Amoxicillin 500mg TID by her PCP but notes no 

improvement. She denies any chest pain, orthopnea, PND, or palpitations.





Primary Care Physician: Dr. Verde








- Physicial Exam


PE: 





11/14/19 13:30


GENERAL: Awake, alert, and fully oriented, in no acute distress.


HEAD: No signs of trauma


EYES: PERRLA, EOMI, sclera anicteric, conjunctiva clear


ENT: Auricles normal inspection, hearing grossly normal, nares patent, 

oropharynx clear without exudates. Moist mucosa


NECK: Nontender, no stepoffs, Normal ROM, supple, no lymphadenopathy, JVD, or 

masses


LUNGS: Breath sounds equal, clear to auscultation bilaterally.  No wheezes, and 

no crackles


HEART: Regular rate and rhythm, normal S1 and S2, no murmurs, rubs or gallops


ABDOMEN: Soft, nontender, normoactive bowel sounds.  No guarding, no rebound.  

No masses


EXTREMITIES: Normal range of motion, no edema.  No clubbing or cyanosis. No 

cords, erythema, or tenderness


NEUROLOGICAL: Cranial nerves II through XII intact. 5/5 strength and sensation 

in all extremities, Normal speech, normal gait, normal cerebellar function


SKIN: Warm, Dry, normal turgor, no rashes or lesions noted.





- Critical Care Time


Total Critical Care Time: 60


Critical Care Statement: The care of this patient involved high complexity 

decision making to prevent further life threatening deterioration of the patient

's condition and/or to evaluate & treat vital organ system(s) failure or risk 

of failure.





- Medical Decision Making





11/14/19 13:44


88 F with HUTSON, cough, fevers. Possible URI vs flu vs PNA. Will evaluate for 

heart failure as well. 


- Labs


- CXR





11/14/19 15:39


Labs notable for severe transaminitis


Bili and alk phos wnl, no evidence of obstructive process


Pt with benign abdomen.





Acetaminophen level negative





Will check hep panel


Consult GI

## 2019-11-15 LAB
ALBUMIN SERPL-MCNC: 3.4 G/DL (ref 3.4–5)
ALP SERPL-CCNC: 90 U/L (ref 45–117)
ALT SERPL-CCNC: 2175 U/L (ref 13–61)
AMPHET UR-MCNC: NEGATIVE NG/ML
ANION GAP SERPL CALC-SCNC: 11 MMOL/L (ref 8–16)
AST SERPL-CCNC: 2755 U/L (ref 15–37)
BARBITURATES UR-MCNC: NEGATIVE NG/ML
BASOPHILS # BLD: 0.2 % (ref 0–2)
BENZODIAZ UR SCN-MCNC: NEGATIVE NG/ML
BILIRUB CONJ SERPL-MCNC: 1 MG/DL (ref 0–0.2)
BILIRUB DIRECT SERPL-MCNC: 1264 U/L (ref 84–246)
BILIRUB SERPL-MCNC: 1.6 MG/DL (ref 0.2–1)
BUN SERPL-MCNC: 20.9 MG/DL (ref 7–18)
CALCIUM SERPL-MCNC: 8.6 MG/DL (ref 8.5–10.1)
CHLORIDE SERPL-SCNC: 103 MMOL/L (ref 98–107)
CO2 SERPL-SCNC: 23 MMOL/L (ref 21–32)
COCAINE UR-MCNC: NEGATIVE NG/ML
CREAT SERPL-MCNC: 1.5 MG/DL (ref 0.55–1.3)
DEPRECATED RDW RBC AUTO: 14.2 % (ref 11.6–15.6)
EOSINOPHIL # BLD: 0.8 % (ref 0–4.5)
GLUCOSE SERPL-MCNC: 176 MG/DL (ref 74–106)
HCT VFR BLD CALC: 38.3 % (ref 32.4–45.2)
HGB BLD-MCNC: 12.2 GM/DL (ref 10.7–15.3)
INR BLD: 1.71 (ref 0.83–1.09)
LYMPHOCYTES # BLD: 9.4 % (ref 8–40)
MAGNESIUM SERPL-MCNC: 1.8 MG/DL (ref 1.8–2.4)
MCH RBC QN AUTO: 26.8 PG (ref 25.7–33.7)
MCHC RBC AUTO-ENTMCNC: 31.8 G/DL (ref 32–36)
MCV RBC: 84.3 FL (ref 80–96)
METHADONE UR-MCNC: NEGATIVE NG/ML
MONOCYTES # BLD AUTO: 7.4 % (ref 3.8–10.2)
NEUTROPHILS # BLD: 82.2 % (ref 42.8–82.8)
OPIATES UR QL SCN: NEGATIVE NG/ML
PCP UR QL SCN: NEGATIVE NG/ML
PLATELET # BLD AUTO: 240 K/MM3 (ref 134–434)
PMV BLD: 8.4 FL (ref 7.5–11.1)
POTASSIUM SERPLBLD-SCNC: 4.2 MMOL/L (ref 3.5–5.1)
PROT SERPL-MCNC: 6.6 G/DL (ref 6.4–8.2)
PT PNL PPP: 20.3 SEC (ref 9.7–13)
RBC # BLD AUTO: 4.54 M/MM3 (ref 3.6–5.2)
SODIUM SERPL-SCNC: 137 MMOL/L (ref 136–145)
WBC # BLD AUTO: 10.6 K/MM3 (ref 4–10)

## 2019-11-15 RX ADMIN — HEPARIN SODIUM SCH UNIT: 5000 INJECTION, SOLUTION INTRAVENOUS; SUBCUTANEOUS at 06:04

## 2019-11-15 RX ADMIN — CARVEDILOL SCH MG: 3.12 TABLET, FILM COATED ORAL at 22:16

## 2019-11-15 RX ADMIN — FUROSEMIDE SCH MG: 10 INJECTION, SOLUTION INTRAVENOUS at 16:17

## 2019-11-15 RX ADMIN — HEPARIN SODIUM SCH UNIT: 5000 INJECTION, SOLUTION INTRAVENOUS; SUBCUTANEOUS at 13:11

## 2019-11-15 RX ADMIN — HEPARIN SODIUM SCH UNIT: 5000 INJECTION, SOLUTION INTRAVENOUS; SUBCUTANEOUS at 22:17

## 2019-11-15 NOTE — CON.CARD
Cardiology Consult (text)





- Consultation


Consultation Note: 





cc:  sob





hpi:  88 f hx hld, ckd (bl 1.2), here with sob.  Past few weeks has noticed sob

, de [place] pa[place].  No cp palps dizzy loc pnd orthopnea le edema.  No known hx hrt dz. Here 

found to have elevated lfts and sev dec lvef on echo.  





pmh: per hpi


psh: hysterectomy


social: no tob


fam: no premature cad


ros: per hpi; all others nl


meds:


 Home Medications











 Medication  Instructions  Recorded


 


Amoxicillin - [Amoxicillin 500mg 500 mg PO TID 11/14/19





Capsule -]  


 


Aspirin [Children's Aspirin] 81 mg PO DAILY 11/14/19


 


Fenofibrate Nanocrystallized 145 mg PO DAILY 11/14/19





[Fenofibrate]  


 


Losartan Potassium 50 mg PO DAILY 11/14/19


 


Lovastatin 20 mg PO DAILY 11/14/19


 


Paroxetine HCl 40 mg PO DAILY 11/14/19


 


Zolpidem Tartrate 10 mg PO DAILY 11/14/19








pe:


 Vital Signs











 Period  Temp  Pulse  Resp  BP Sys/Reyes  Pulse Ox


 


 Last 24 Hr  97.3 F-100.6 F    17-22  123-151/75-90  








nad no jvd


rrr s1s2 no mrg


bibasilar crackles nl eff


aao3


no le e/c/c


abd nt nd pos bs


no jaundice diaphoresis


pos dp pt no carotid bruits





 Laboratory Last Values











WBC  10.6 K/mm3 (4.0-10.0)  H  11/15/19  07:00    


 


RBC  4.54 M/mm3 (3.60-5.2)   11/15/19  07:00    


 


Hgb  12.2 GM/dL (10.7-15.3)   11/15/19  07:00    


 


Hct  38.3 % (32.4-45.2)   11/15/19  07:00    


 


MCV  84.3 fl (80-96)   11/15/19  07:00    


 


MCH  26.8 pg (25.7-33.7)   11/15/19  07:00    


 


MCHC  31.8 g/dl (32.0-36.0)  L  11/15/19  07:00    


 


RDW  14.2 % (11.6-15.6)   11/15/19  07:00    


 


Plt Count  240 K/MM3 (134-434)   11/15/19  07:00    


 


MPV  8.4 fl (7.5-11.1)   11/15/19  07:00    


 


Absolute Neuts (auto)  8.7 K/mm3 (1.5-8.0)  H  11/15/19  07:00    


 


Neutrophils %  82.2 % (42.8-82.8)   11/15/19  07:00    


 


Lymphocytes %  9.4 % (8-40)  D 11/15/19  07:00    


 


Monocytes %  7.4 % (3.8-10.2)   11/15/19  07:00    


 


Eosinophils %  0.8 % (0-4.5)  D 11/15/19  07:00    


 


Basophils %  0.2 % (0-2.0)   11/15/19  07:00    


 


Nucleated RBC %  0 % (0-0)   11/15/19  07:00    


 


PT with INR  20.30 SEC (9.7-13.0)  H  11/15/19  07:00    


 


INR  1.71  (0.83-1.09)  H  11/15/19  07:00    


 


PTT (Actin FS)  27.2 SECONDS (25.2-36.5)   11/14/19  14:39    


 


Sodium  137 mmol/L (136-145)   11/15/19  07:00    


 


Potassium  4.2 mmol/L (3.5-5.1)   11/15/19  07:00    


 


Chloride  103 mmol/L ()   11/15/19  07:00    


 


Carbon Dioxide  23 mmol/L (21-32)   11/15/19  07:00    


 


Anion Gap  11 MMOL/L (8-16)   11/15/19  07:00    


 


BUN  20.9 mg/dL (7-18)  H  11/15/19  07:00    


 


Creatinine  1.5 mg/dL (0.55-1.3)  H  11/15/19  07:00    


 


Est GFR (CKD-EPI)AfAm  35.68   11/15/19  07:00    


 


Est GFR (CKD-EPI)NonAf  30.78   11/15/19  07:00    


 


Random Glucose  176 mg/dL ()  H  11/15/19  07:00    


 


Lactic Acid  5.7 mmol/L (0.4-2.0)  H*  11/15/19  11:45    


 


Calcium  8.6 mg/dL (8.5-10.1)   11/15/19  07:00    


 


Phosphorus  2.2 mg/dL (2.5-4.9)  L  11/14/19  19:57    


 


Magnesium  1.8 mg/dL (1.8-2.4)   11/15/19  07:00    


 


Total Bilirubin  1.6 mg/dL (0.2-1)  H  11/15/19  07:00    


 


Direct Bilirubin  1.0 mg/dL (0.0-0.2)  H  11/15/19  07:00    


 


AST  > 2002 U/L (15-37)  H  11/15/19  07:00    


 


ALT  2175 U/L (13-61)  H  11/15/19  07:00    


 


Alkaline Phosphatase  90 U/L ()   11/15/19  07:00    


 


LD Total  1264 U/L ()  H  11/15/19  07:00    


 


Creatine Kinase  158 U/L ()   11/14/19  14:20    


 


Creatine Kinase Index  1.5 % (0.0-5.0)   11/14/19  14:20    


 


CK-MB (CK-2)  2.5 ng/mL (0.5-3.6)   11/14/19  14:20    


 


Troponin I  0.04 ng/ml (0.00-0.05)   11/14/19  14:20    


 


B-Natriuretic Peptide  6499.0 pg/ml (5-450)  H  11/14/19  12:50    


 


Total Protein  6.6 g/dl (6.4-8.2)   11/15/19  07:00    


 


Albumin  3.4 g/dl (3.4-5.0)   11/15/19  07:00    


 


Lipase  73 U/L ()   11/14/19  14:20    


 


Urine Color  Dk yellow   11/14/19  12:50    


 


Urine Appearance  Clear   11/14/19  12:50    


 


Urine pH  5.0  (5.0-8.0)   11/14/19  12:50    


 


Ur Specific Gravity  1.026  (1.010-1.035)   11/14/19  12:50    


 


Urine Protein  2+  (NEGATIVE)  H  11/14/19  12:50    


 


Urine Glucose (UA)  3+  (NEGATIVE)  H  11/14/19  12:50    


 


Urine Ketones  Trace  (NEGATIVE)  H  11/14/19  12:50    


 


Urine Blood  Negative  (NEGATIVE)   11/14/19  12:50    


 


Urine Nitrite  Negative  (NEGATIVE)   11/14/19  12:50    


 


Urine Bilirubin  Negative  (NEGATIVE)   11/14/19  12:50    


 


Urine Urobilinogen  1.0 mg/dL (0.2-1.0)   11/14/19  12:50    


 


Ur Leukocyte Esterase  Negative  (NEGATIVE)   11/14/19  12:50    


 


Urine WBC (Auto)  2 /hpf (0-5)   11/14/19  12:50    


 


Urine RBC (Auto)  2 /hpf (0-4)   11/14/19  12:50    


 


Urine Casts (Auto)  6 /lpf (0-8)   11/14/19  12:50    


 


U Epithel Cells (Auto)  5.7 /HPF (0-5/HPF)   11/14/19  12:50    


 


Urine Bacteria (Auto)  4.3 /hpf (NEGATIVE)   11/14/19  12:50    


 


Opiates Screen  Negative ng/ml (LUGSZL=680)   11/15/19  11:28    


 


Methadone Screen  Negative ng/ml (FQTJOQ=805)   11/15/19  11:28    


 


Acetaminophen  <2.0   11/14/19  14:10    


 


Barbiturate Screen  Negative ng/ml (JOULVA=744)   11/15/19  11:28    


 


Phencyclidine Screen  Negative ng/ml (CUTOFF=25)   11/15/19  11:28    


 


Ur Amphetamines Screen  Negative ng/ml (GUAJOZ=209)   11/15/19  11:28    


 


MDMA (Ecstasy) Screen  Negative ng/ml (CMXAEM=884)   11/15/19  11:28    


 


Benzodiazepines Screen  Negative ng/ml (BDHVBU=439)   11/15/19  11:28    


 


Cocaine Screen  Negative ng/ml (IZYLQB=949)   11/15/19  11:28    


 


U Marijuana (THC) Screen  Negative ng/ml (CUTOFF=50)   11/15/19  11:28    


 


Alcohol, Quantitative  < 3.0 mg/dL (0.0-5.0)   11/15/19  07:00    








ecg: sr lbbb





cxr: clear lungs





echo 11/2019:  lvef 20-25, global hk, nl rv, sev tr, mild lae, mod mr, rvsp 40-

50








a/p:  88 f hx hld, ckd (bl 1.2), here with sob.





sob, acute systolic chf:


-no known hx hrt dz but echo here showing reduced lvef and sev tr


-seems that pt has vol overload with hepatic congestion causing elevated lfts


-will start lasix 40 iv qd and monitor daily wts, chemistry


-start coreg for chf regimen.  hold off on ace/arb given yane at the moment





hld:


-statin held 2/2 elevated lfts





yane on ckd:


-monitor cr with diuresis, possibly cardiorenal








abnl ecg:


-lbbb likely due to sev dec lvef.  ischemic w/u when vol status improved.

## 2019-11-15 NOTE — PN
Teaching Attending Note


Name of Resident: Marleny Galloway





ATTENDING PHYSICIAN STATEMENT





I saw and evaluated the patient.


I reviewed the resident's note and discussed the case with the resident.


I agree with the resident's findings and plan as documented.








SUBJECTIVE:





She feels better, has no pain or SOB . has no cough . no CP . denies any 

diarrhea. she does not remember much about last week. family needs to be 

contacted but per chart her sx started before she took amoxi. she reports HUTSON .

  she denies any diarrhea, ro urinary sx 


OBJECTIVE:





NAD , awake, alert, cooperative 


HEENT: dry MM. + JVD . No facial droop, no thrush. round equal pupils. 


CV: RRR, 


Lungs: CTAB 


Abd: soft, NT, ND, NL BS ,liver is not palpable and not percussable 


Ext: No edema , no erythema. no fungal infection in feet 


Skin : dry, No axillary sweating 





ASSESSMENT AND PLAN:





87 y/o lady with h/o HTN, HLP, Hysterectomy who presented with fever , chills x 

1 -2 weeks and was found to have transaminitis . 








1- Transaminitis /liver failure: Unclear etiology. Sx started before her 

amoxicillin exposure , so doubt that as a cause. She is on statin and fibrate 

but doubt that acute sx and elevation in LFTS is solely due to that.  


In DDX: autoimmune hepatitis, viral hepatitis. can't r/o sepsis from other 

infectious source as a cause of transaminitis ( less likely). ? transient 

hypotension at home, but no convincing history.  No protal vein thrombosis on 

US .


She looks volume depleted on exam, I doubt that she is in heart failure causing 

liver congestion 


Obstruction was not r/o completely especially with elevation in direct bili and 

presence of gall stones. but doubt it as Transaminitis is improving. 





- MRCP pending 


- follwo viral hepatitie panel 


- follow HARISH, anti-smooth muscle Abs.


- AMA needs authorization 


- monitor INR. 


- Hold statin and fibrate 





2- SIRS: no source of infection can be identified. fever could be part of the 

liver process. 


- Hold off abx now


- ID Recs pending 


- follow blood cx 


- Urien cx with low colony count of GNR. 





3- MASSIEL: she looks volume depleted on exam.( no axillary sweating, dry skin and 

MM, no crackles on lung exam)  


- start IVF . 


- monitor kidney function.


-US with no hydro


- Hold losartan  





4- HUTSON: not clear of cause. but clinically she does not seem to be in heart 

failure. 


- Echo pending 


- monitor on IVF. 





5- H/o HTN: hold losratan due to MASSIEL. monitor BP 





6- DVT PX : heparin SQ. INR is increasing . monitor that

## 2019-11-15 NOTE — PROC
Procedure Note


Procedure: 





After several failed attempts by nursing, surgery was asked to place IV in 

patient who is has difficult access.





Under sterile prep and technique, a 24 gauge IV was placed at the dorsum of the 

patients right foot.


The line aspirated and flushed easily.


The line was well secured.





The patient tolerated the procedure.

## 2019-11-15 NOTE — CON.ID
Consult


Reason for Consultation:: hepatitis





- History of Present Illness


Chief Complaint: shortness of breath


History of Present Illness: 


Ms. Toledo is an 87y/o female with HTN and HLD who presents with progressive 

shortness of breath and fatigue. Per daughter, the patient has become 

increasingly dyspneic on exertion over the last several months and especially 

in the last week. The patient also reports she has not been gardening over the 

last year because she has "not felt like it." The daughter reported pt had a 

sore throat and subjective fevers 8 days ago, and pt was called in amoxicillin 

5 days ago by PMD and took ~4 doses before stopped because she was feeling 

worse. Pt denies cough, wheezing, chest pain, abdominal pain, n/v/d, dysuria, 

myalgias, fever, chills, or rash.





Pt's daughter reports she was sweeping dirt 2 weeks ago while outside and is 

concerned about exposure to soil. There has been no recent travel. Pt lives in 

a house with another daughter who had some URI symptoms last week; no other 

sick contacts. She received the flu shot this season.





Pt reports not having seen PMD in a year and a half and receives HTN and HLD 

medications by refills. She also takes OTC MV and Calcium. There has been no 

recent changes to medications. Pt's daughter reports a change in mental status 

since she last saw her in September. She is not as "sharp" as she was 

previously and is "vague" at times while speaking. She ambulates without 

assistance.





- History Source


History Provided By: Patient, Family Member





- Past Medical History


Cardio/Vascular: Yes: HTN, Hyperlipdemia


...Pregnant: No





- Past Surgical History


Past Surgical History: Yes: Hysterectomy (ROXANA/BSO)





- Alcohol/Substance Use


Hx Alcohol Use: No


History of Substance Use: reports: None





- Smoking History


Smoking history: Never smoked


Have you smoked in the past 12 months: No





- Social History


Usual Living Arrangement: With Child


ADL: Independent


Occupation: Retired 


History of Recent Travel: No





Home Medications





- Allergies


Allergies/Adverse Reactions: 


 Allergies











Allergy/AdvReac Type Severity Reaction Status Date / Time


 


metoclopramide [From Reglan] Allergy   Verified 11/14/19 11:40


 


prochlorperazine Allergy   Verified 11/14/19 11:39





[From Compazine]     














- Home Medications


Home Medications: 


Ambulatory Orders





Amoxicillin - [Amoxicillin 500mg Capsule -] 500 mg PO TID 11/14/19 


Aspirin [Children's Aspirin] 81 mg PO DAILY 11/14/19 


Fenofibrate Nanocrystallized [Fenofibrate] 145 mg PO DAILY 11/14/19 


Losartan Potassium 50 mg PO DAILY 11/14/19 


Lovastatin 20 mg PO DAILY 11/14/19 


Paroxetine HCl 40 mg PO DAILY 11/14/19 


Zolpidem Tartrate 10 mg PO DAILY 11/14/19 











Review of Systems





- Review of Systems


Constitutional: reports: Chills, Fever


HENT: denies: Nasal Congestion, Throat Pain


Cardiovascular: reports: Shortness of Breath.  denies: Chest Pain


Respiratory: denies: Cough


Gastrointestinal: denies: Abdominal Pain, Constipation, Diarrhea, Nausea, 

Vomiting


Genitourinary: denies: Dysuria


Musculoskeletal: denies: Muscle Pain





Physical Exam


Vital Signs: 


 Vital Signs











Temperature  97.9 F   11/15/19 10:00


 


Pulse Rate  92 H  11/15/19 10:00


 


Respiratory Rate  18   11/15/19 10:00


 


Blood Pressure  151/84   11/15/19 10:00


 


O2 Sat by Pulse Oximetry (%)  99   11/15/19 09:00











Constitutional: Yes: Well Nourished, No Distress


Eyes: Yes: Conjunctiva Clear, EOM Intact.  No: Sclera Icterus


HENT: Yes: Atraumatic, Normocephalic


Neck: Yes: Supple, Trachea Midline.  No: Lymphadenopathy


Cardiovascular: Yes: Regular Rate and Rhythm, Gallop


Respiratory: Yes: Other (crackles at bases bilaterally).  No: Wheezes


Gastrointestinal: Yes: Normal Bowel Sounds, Soft.  No: Tenderness


Edema: No


Integumentary: Yes: Other (healed hysterectomy scar on lower abdomen)


Neurological: Yes: Alert, Oriented.  No: Asterixis


Labs: 


 CBC, BMP





 11/15/19 07:00 





 11/15/19 07:00 











Imaging





- Results


Chest X-ray: Report Reviewed (no evidence of acute pulmonary disease), Image 

Reviewed


Ultrasound: Report Reviewed (cholelithiasis with no evidence of acute 

cholecystitis, possible diffuse hepatic steatosis, splenic cyst ~3cm x 3cm x 

3cm with some peripheral calcification, possible new pleural effusions 

bilaterally at bases)


EKG: Report Reviewed, Image Reviewed (, PACs, QTc 546, non-specific T 

wave abnormalities inferior leads and T wave inversion more prominent in 

lateral leads)





Assessment/Plan


Ms. Toledo is an 87y/o female with HTN and HLD who presents with progressive 

shortness of breath and fatigue. Per daughter, the patient has become 

increasingly dyspneic on exertion over the last several months and especially 

in the last week. Pt was initially febrile and had leukocytosis but fever 

resolved and white count is borderline normal. Pt was found to have severe 

transaminitis and lactic acidosis. Echo showed LVH with EF 20-25% and RV 

pressure 40-50 with severe TR. 





#r/o hepatitis


EBV profile


CMV


hepatitis panel pending


blood cx pending- prelim negative


urine cx- no significant findings

## 2019-11-15 NOTE — PN.GI
GI Progress Note


Subjective: 





GI NOte: Given the EF found on echo Pamela's nonproductive cough and 

diaphoreiss on minimal exertion more likely reflect her heart failure than an 

URI. This also suggests that her liver abnormalities reflect congestive 

hepatopathy or perhaps shock liver from a transient hypotensive episode. She 

denies any h/o liver disease or any predispositions to it.   I discussed the 

situation with both daughters.





- Objective


Vital Signs: 


 Vital Signs











Temperature  97.3 F L  11/15/19 13:53


 


Pulse Rate  104 H  11/15/19 13:53


 


Respiratory Rate  20   11/15/19 13:53


 


Blood Pressure  148/81   11/15/19 13:53


 


O2 Sat by Pulse Oximetry (%)  99   11/15/19 09:00








 Laboratory Tests











  11/14/19 11/14/19 11/14/19





  12:50 12:50 14:20


 


WBC  12.2 H  


 


Plt Count   


 


Total Bilirubin    1.0


 


Direct Bilirubin   


 


AST    3495 H


 


ALT    2170 H


 


Alkaline Phosphatase    84


 


LD Total   


 


B-Natriuretic Peptide   6499.0 H 














  11/15/19 11/15/19





  07:00 07:00


 


WBC  10.6 H 


 


Plt Count  240 


 


Total Bilirubin   1.6 H


 


Direct Bilirubin   1.0 H


 


AST   > 2002 H


 


ALT   2175 H


 


Alkaline Phosphatase   90


 


LD Total   1264 H


 


B-Natriuretic Peptide  











Constitutional: Calm


Eyes: Yes: Conjunctiva Clear


Cardiovascular: Yes: Tachycardia


...Auscultate: Yes: Normoactive Bowel Sounds


...Palpate: Yes: Soft, Other (nontender)


Labs: 


 CBC, BMP





 11/15/19 07:00 





 11/15/19 07:00 





 INR, PTT











INR  1.71  (0.83-1.09)  H  11/15/19  07:00    














Assessment/Plan





Assessment:


-- Suspect congestive hepatopathy or perhaps shock liver given her CHF but 

cannot exclude a DILI ( drug induced liver injury). Doubt a chronic liver 

disease. Do not believe that her gallstones are implicated. Anticipate 

normalization when her cardiac situation is managed. 





Plan:


-- Await pending studies and MRCP





 Dr Man will be covering this weekend. Please call him as needed. 


 





Problem List





- Problems


(1) Abnormal liver enzymes


Code(s): R74.8 - ABNORMAL LEVELS OF OTHER SERUM ENZYMES   





(2) Hypertension


Code(s): I10 - ESSENTIAL (PRIMARY) HYPERTENSION   





(3) Hyperlipidemia


Code(s): E78.5 - HYPERLIPIDEMIA, UNSPECIFIED   





(4) New onset of congestive heart failure


Code(s): I50.9 - HEART FAILURE, UNSPECIFIED

## 2019-11-15 NOTE — ECHO
______________________________________________________________________________



Name: DENICE GARAY                                  Exam:Adult Echocardiogram

MRN: [id number]         Study Date: 11/15/2019 09:14 AM

Age: 88 yrs

______________________________________________________________________________



Reason For Study: evaluate heart function

Height: 63 in        Weight: 180 lb        BSA: 1.8 m2



______________________________________________________________________________



MMode/2D Measurements & Calculations

LVOT diam: 2.0 cm                                     LVLd ap4: 6.6 cm

                                                      EDV(MOD-sp4): 68.0 ml

                                                      LVLs ap4: 6.2 cm

                                                      ESV(MOD-sp4): 41.0 ml



_______________________________________________________

SV(MOD-sp4): 27.0 ml                                  LAV (MOD-bp): 44.0 ml



Doppler Measurements & Calculations

Ao V2 max: 103.2 cm/sec                             LV V1 max P.8 mmHg

Ao max P.3 mmHg                                 LV V1 max: 83.4 cm/sec



CLIFF(V,D): 2.5 cm2

_____________________________________________________



MR max romeo: 540.0 cm/sec                            TR max romeo: 321.9 cm/sec

MR max P.6 mmHg                               TR max P.9 mmHg

_____________________________________________________

PA V2 max: 89.9 cm/sec                              Med Peak E' Romeo: 3.7 cm/sec

PA max PG: 3.3 mmHg                                 Lat Peak E' Romeo: 8.0 cm/sec





______________________________________________________________________________



Left Ventricle

The left ventricle is mildly dilated. Left ventricular systolic function is severely reduced. Ejectio
n

Fraction = 20-25%. There is severe global hypokinesis of the left ventricle. Paradoxical septal motio
n.

Right Ventricle

The right ventricle is grossly normal size. The right ventricular systolic function is grossly normal
.

Atria

The left atrium is mildly dilated. Right atrial size is normal.

Mitral Valve

The mitral valve is normal in structure and function. There is no mitral valve stenosis. There is mod
erate

mitral regurgitation.

Tricuspid Valve

The tricuspid valve is normal in structure and function. There is severe tricuspid regurgitation. Rig
ht

ventricular systolic pressure is elevated at 40-50mmHg.

Aortic Valve

There is mild aortic sclerosis.;. No hemodynamically significant valvular aortic stenosis. No aortic

regurgitation is present.

Pulmonic Valve

The pulmonic valve is not well seen, but is grossly normal. There is no pulmonic valvular stenosis.

Great Vessels

The aortic root is normal size.

Pericardium/Pleura

There is no pericardial effusion.

______________________________________________________________________________





Interpretation Summary

There is severe global hypokinesis of the left ventricle.

Paradoxical septal motion.

Left ventricular systolic function is severely reduced.

Ejection Fraction = 20-25%.

The left atrium is mildly dilated.

There is moderate mitral regurgitation.

There is severe tricuspid regurgitation.

Right ventricular systolic pressure is elevated at 40-50mmHg.





MD Ramírez *Ephraim 11/15/2019 10:47 AM

## 2019-11-15 NOTE — PN
Teaching Attending Note


Name of Resident: Layla Ly





ATTENDING PHYSICIAN STATEMENT





I saw and evaluated the patient.


I reviewed the resident's note and discussed the case with the resident.


I agree with the resident's findings and plan as documented.








SUBJECTIVE:


patient seen and examined


feels fatigued


increased SOB with exertion has been progressing over the last several months


started amox this week for uri symptoms


has not seen PMD for over one year


no travel


no pets


denies new meds except amox


no herbals














OBJECTIVE:


 Vital Signs











 Period  Temp  Pulse  Resp  BP Sys/Reyes  Pulse Ox


 


 Last 24 Hr  97.3 F-100.6 F    17-22  123-151/75-90  








cor-rrr


lungs bibasilar rales


abd soft,nt


ext no edema





 CBC, BMP





 11/15/19 07:00 





 11/15/19 07:00 


 Laboratory Tests











  11/15/19





  07:00


 


Total Bilirubin  1.6 H


 


Direct Bilirubin  1.0 H


 


AST  > 2002 H


 


ALT  2175 H


 


Alkaline Phosphatase  90








 Microbiology





11/14/19 12:50   Blood - Peripheral Venous   Blood Culture - Preliminary


                            NO GROWTH OBTAINED AFTER 24 HOURS, INCUBATION TO 

CONTINUE


                            FOR 4 DAYS.


11/14/19 12:50   Blood - Peripheral Venous   Blood Culture - Preliminary


                            NO GROWTH OBTAINED AFTER 24 HOURS, INCUBATION TO 

CONTINUE


                            FOR 4 DAYS.


11/14/19 12:50   Urine - Urine Clean Catch   Urine Culture - Final


                            Lactose Fermenting Neg Bacilli

















ASSESSMENT AND PLAN:





hepatitis-?viral ?meds, ?cardiac-echo results noted , ef 20-25%- ?heart failure


for MRCP


agree with hepatitis/cmv/ebv serology


GI f/u


cardiology evaluation 





Problem List





- Problems


(1) Hepatitis


Code(s): K75.9 - INFLAMMATORY LIVER DISEASE, UNSPECIFIED   





(2) New onset of congestive heart failure


Code(s): I50.9 - HEART FAILURE, UNSPECIFIED

## 2019-11-15 NOTE — PN
Physical Exam: 


SUBJECTIVE: Patient seen and examined in the morning. No acute events 

overnight. Patient feels much better now. No complaints of chest pain, 

shortness of breath, abdominal pain, nausea, vomiting, diarrhea, fever, chills. 








OBJECTIVE:





 Vital Signs











 Period  Temp  Pulse  Resp  BP Sys/Reyes  Pulse Ox


 


 Last 24 Hr  97.3 F-100.6 F    17-22  123-151/75-90  











GENERAL: The patient is awake, alert, and fully oriented, in no acute distress.


HEAD: Normal with no signs of trauma.


EYES: PERRL, extraocular movements intact, sclera anicteric, conjunctiva clear. 

No ptosis. 


NECK: Trachea midline, mild JVD


LUNGS: Breath sounds equal, clear to auscultation bilaterally, no wheezes, no 

crackles, n


HEART: Regular rate and rhythm, S1, S2 without murmur, rub or gallop.


ABDOMEN: Soft, nontender, nondistended, normoactive bowel sounds, no guarding, 

no rebound. 


EXTREMITIES: 2+ pulses, warm, well-perfused, no edema. 


NEUROLOGICAL: Cranial nerves II through XII grossly intact. Normal speech. 














 Laboratory Results - last 24 hr











  11/14/19 11/14/19 11/14/19





  14:10 14:20 14:32


 


WBC   


 


RBC   


 


Hgb   


 


Hct   


 


MCV   


 


MCH   


 


MCHC   


 


RDW   


 


Plt Count   


 


MPV   


 


Absolute Neuts (auto)   


 


Neutrophils %   


 


Lymphocytes %   


 


Monocytes %   


 


Eosinophils %   


 


Basophils %   


 


Nucleated RBC %   


 


PT with INR    20.00 H


 


INR    1.69 H


 


PTT (Actin FS)   


 


Sodium   133 L 


 


Potassium   4.8 


 


Chloride   100 


 


Carbon Dioxide   24 


 


Anion Gap   9 


 


BUN   13.7 


 


Creatinine   1.2 


 


Est GFR (CKD-EPI)AfAm   46.73 


 


Est GFR (CKD-EPI)NonAf   40.32 


 


Random Glucose   271 H 


 


Lactic Acid   


 


Calcium   8.8 


 


Phosphorus   


 


Magnesium   


 


Total Bilirubin   1.0 


 


Direct Bilirubin   


 


AST   3495 H 


 


ALT   2170 H 


 


Alkaline Phosphatase   84 


 


LD Total   


 


Creatine Kinase   158 


 


Creatine Kinase Index   1.5 


 


CK-MB (CK-2)   2.5 


 


Troponin I   0.04 


 


Total Protein   6.8 


 


Albumin   3.5 


 


Lipase   73 


 


Opiates Screen   


 


Methadone Screen   


 


Acetaminophen  <2.0  


 


Barbiturate Screen   


 


Phencyclidine Screen   


 


Ur Amphetamines Screen   


 


MDMA (Ecstasy) Screen   


 


Benzodiazepines Screen   


 


Cocaine Screen   


 


U Marijuana (THC) Screen   


 


Alcohol, Quantitative   














  11/14/19 11/14/19 11/14/19





  14:39 14:39 19:57


 


WBC   


 


RBC   


 


Hgb   


 


Hct   


 


MCV   


 


MCH   


 


MCHC   


 


RDW   


 


Plt Count   


 


MPV   


 


Absolute Neuts (auto)   


 


Neutrophils %   


 


Lymphocytes %   


 


Monocytes %   


 


Eosinophils %   


 


Basophils %   


 


Nucleated RBC %   


 


PT with INR   


 


INR   


 


PTT (Actin FS)  27.2  


 


Sodium   


 


Potassium   


 


Chloride   


 


Carbon Dioxide   


 


Anion Gap   


 


BUN   


 


Creatinine   


 


Est GFR (CKD-EPI)AfAm   


 


Est GFR (CKD-EPI)NonAf   


 


Random Glucose   


 


Lactic Acid   4.1 H* 


 


Calcium   


 


Phosphorus    2.2 L


 


Magnesium   


 


Total Bilirubin   


 


Direct Bilirubin   


 


AST   


 


ALT   


 


Alkaline Phosphatase   


 


LD Total   


 


Creatine Kinase   


 


Creatine Kinase Index   


 


CK-MB (CK-2)   


 


Troponin I   


 


Total Protein   


 


Albumin   


 


Lipase   


 


Opiates Screen   


 


Methadone Screen   


 


Acetaminophen   


 


Barbiturate Screen   


 


Phencyclidine Screen   


 


Ur Amphetamines Screen   


 


MDMA (Ecstasy) Screen   


 


Benzodiazepines Screen   


 


Cocaine Screen   


 


U Marijuana (THC) Screen   


 


Alcohol, Quantitative   














  11/14/19 11/15/19 11/15/19





  21:35 07:00 07:00


 


WBC   10.6 H 


 


RBC   4.54 


 


Hgb   12.2 


 


Hct   38.3 


 


MCV   84.3 


 


MCH   26.8 


 


MCHC   31.8 L 


 


RDW   14.2 


 


Plt Count   240 


 


MPV   8.4 


 


Absolute Neuts (auto)   8.7 H 


 


Neutrophils %   82.2 


 


Lymphocytes %   9.4  D 


 


Monocytes %   7.4 


 


Eosinophils %   0.8  D 


 


Basophils %   0.2 


 


Nucleated RBC %   0 


 


PT with INR   


 


INR   


 


PTT (Actin FS)   


 


Sodium    137


 


Potassium    4.2


 


Chloride    103


 


Carbon Dioxide    23


 


Anion Gap    11


 


BUN    20.9 H


 


Creatinine    1.5 H


 


Est GFR (CKD-EPI)AfAm    35.68


 


Est GFR (CKD-EPI)NonAf    30.78


 


Random Glucose    176 H


 


Lactic Acid  4.2 H*  


 


Calcium    8.6


 


Phosphorus   


 


Magnesium    1.8


 


Total Bilirubin    1.6 H


 


Direct Bilirubin    1.0 H


 


AST    > 2002 H


 


ALT    2175 H


 


Alkaline Phosphatase    90


 


LD Total    1264 H


 


Creatine Kinase   


 


Creatine Kinase Index   


 


CK-MB (CK-2)   


 


Troponin I   


 


Total Protein    6.6


 


Albumin    3.4


 


Lipase   


 


Opiates Screen   


 


Methadone Screen   


 


Acetaminophen   


 


Barbiturate Screen   


 


Phencyclidine Screen   


 


Ur Amphetamines Screen   


 


MDMA (Ecstasy) Screen   


 


Benzodiazepines Screen   


 


Cocaine Screen   


 


U Marijuana (THC) Screen   


 


Alcohol, Quantitative   














  11/15/19 11/15/19 11/15/19





  07:00 07:00 07:00


 


WBC   


 


RBC   


 


Hgb   


 


Hct   


 


MCV   


 


MCH   


 


MCHC   


 


RDW   


 


Plt Count   


 


MPV   


 


Absolute Neuts (auto)   


 


Neutrophils %   


 


Lymphocytes %   


 


Monocytes %   


 


Eosinophils %   


 


Basophils %   


 


Nucleated RBC %   


 


PT with INR  20.30 H  


 


INR  1.71 H  


 


PTT (Actin FS)   


 


Sodium   


 


Potassium   


 


Chloride   


 


Carbon Dioxide   


 


Anion Gap   


 


BUN   


 


Creatinine   


 


Est GFR (CKD-EPI)AfAm   


 


Est GFR (CKD-EPI)NonAf   


 


Random Glucose   


 


Lactic Acid   5.4 H* 


 


Calcium   


 


Phosphorus   


 


Magnesium   


 


Total Bilirubin   


 


Direct Bilirubin   


 


AST   


 


ALT   


 


Alkaline Phosphatase   


 


LD Total   


 


Creatine Kinase   


 


Creatine Kinase Index   


 


CK-MB (CK-2)   


 


Troponin I   


 


Total Protein   


 


Albumin   


 


Lipase   


 


Opiates Screen   


 


Methadone Screen   


 


Acetaminophen   


 


Barbiturate Screen   


 


Phencyclidine Screen   


 


Ur Amphetamines Screen   


 


MDMA (Ecstasy) Screen   


 


Benzodiazepines Screen   


 


Cocaine Screen   


 


U Marijuana (THC) Screen   


 


Alcohol, Quantitative    < 3.0














  11/15/19 11/15/19





  11:28 11:45


 


WBC  


 


RBC  


 


Hgb  


 


Hct  


 


MCV  


 


MCH  


 


MCHC  


 


RDW  


 


Plt Count  


 


MPV  


 


Absolute Neuts (auto)  


 


Neutrophils %  


 


Lymphocytes %  


 


Monocytes %  


 


Eosinophils %  


 


Basophils %  


 


Nucleated RBC %  


 


PT with INR  


 


INR  


 


PTT (Actin FS)  


 


Sodium  


 


Potassium  


 


Chloride  


 


Carbon Dioxide  


 


Anion Gap  


 


BUN  


 


Creatinine  


 


Est GFR (CKD-EPI)AfAm  


 


Est GFR (CKD-EPI)NonAf  


 


Random Glucose  


 


Lactic Acid   5.7 H*


 


Calcium  


 


Phosphorus  


 


Magnesium  


 


Total Bilirubin  


 


Direct Bilirubin  


 


AST  


 


ALT  


 


Alkaline Phosphatase  


 


LD Total  


 


Creatine Kinase  


 


Creatine Kinase Index  


 


CK-MB (CK-2)  


 


Troponin I  


 


Total Protein  


 


Albumin  


 


Lipase  


 


Opiates Screen  Negative 


 


Methadone Screen  Negative 


 


Acetaminophen  


 


Barbiturate Screen  Negative 


 


Phencyclidine Screen  Negative 


 


Ur Amphetamines Screen  Negative 


 


MDMA (Ecstasy) Screen  Negative 


 


Benzodiazepines Screen  Negative 


 


Cocaine Screen  Negative 


 


U Marijuana (THC) Screen  Negative 


 


Alcohol, Quantitative  








Active Medications











Generic Name Dose Route Start Last Admin





  Trade Name Freq  PRN Reason Stop Dose Admin


 


Aspirin  81 mg  11/16/19 10:00  





  Asa -  PO   





  DAILY [facility]   





     





     





     





     


 


Heparin Sodium (Porcine)  5,000 unit  11/15/19 06:00  11/15/19 13:11





  Heparin -  SQ   5,000 unit





  TID [facility]   Administration





     





     





     





     


 


Non-Formulary Medication  40 mg  11/15/19 15:00  





  Paroxetine Hcl [Paroxetine Hcl]  PO   





  DAILY [facility]   





     





     





     





     


 


Non-Formulary Medication  10 mg  11/15/19 22:00  





  Zolpidem Tartrate [Zolpidem Tartrate]  PO   





  HS [facility]   





     





     





     





     











ASSESSMENT/PLAN:


88 F with PMH of HTN, HLD, presented with weakness and shortness of breath with 

transaminitis. 








1) Transaminitis from Drug Induced Liver Injury vs Shock Liver


-LFTs on admission AST: 3495, ALT: 2170. Today AST: >2002 and ALT: 2175


-RUQ U/S shows cholelithiasis without sonographic evidence of acute 

cholecystitis. No definite biliary tract dilatation. Complex 2.6 X 3.4 X 3 cm 

splenic cystic structure is seen with several areas of peripheral 

calcification. 


-MRI/MRCP done. Pending results


-CK level 158


-HARISH/ASMA ordered


-If development of encephalopathy, would transfer to liver center for further 

eval.


-Avoiding amxocillin


-Holding Fenofibrate and Statin


-Monitor INR


-GI consulted, appreciate recs. 





2)SIRS 


-Fever, with tachycardia,  with lactic acidosis


-Lactic acid trending up to 5.7


-Blood culture pending


-Urine culture growing Lactose fermenting gram negative bacilli


-ID consulted,appreciate recs





3)CHF


-BNP 6499


-Echo: EF of 20-25%


-Lasix 40 mg IV Daily started


-Carvedilol 3.125 mg PO BID


-Cardiology consulted, appreciate recs





4)MASSIEL


-Monitor Kidney Function


-Renal U/S doesn't show hydronephrosis


-Holding Losartan





5) Hx of HTN


-Holding Losartan





DVT Prophylaxis: Heparin SQ 5000 unit TID





F:No fluids


E: Trend BMP


N: Sodium controlled diet





Visit type





- Emergency Visit


Emergency Visit: Yes


ED Registration Date: 11/14/19


Care time: The patient presented to the Emergency Department on the above date 

and was hospitalized for further evaluation of their emergent condition.





- New Patient


This patient is new to me today: Yes


Date on this admission: 11/15/19





- Critical Care


Critical Care patient: No





ATTENDING PHYSICIAN STATEMENT





I saw and evaluated the patient.


I reviewed the resident's note and discussed the case with the resident.


I agree with the resident's findings and plan as documented.








SUBJECTIVE:








OBJECTIVE:








ASSESSMENT AND PLAN:

## 2019-11-16 LAB
ALBUMIN SERPL-MCNC: 3.4 G/DL (ref 3.4–5)
ALP SERPL-CCNC: 97 U/L (ref 45–117)
ALT SERPL-CCNC: 1966 U/L (ref 13–61)
ALT SERPL-CCNC: > 1000 U/L (ref 13–61)
ANION GAP SERPL CALC-SCNC: 9 MMOL/L (ref 8–16)
AST SERPL-CCNC: > 1000 U/L (ref 15–37)
BASOPHILS # BLD: 0.3 % (ref 0–2)
BILIRUB CONJ SERPL-MCNC: 0.7 MG/DL (ref 0–0.2)
BILIRUB SERPL-MCNC: 1.2 MG/DL (ref 0.2–1)
BUN SERPL-MCNC: 26.6 MG/DL (ref 7–18)
CALCIUM SERPL-MCNC: 8.5 MG/DL (ref 8.5–10.1)
CHLORIDE SERPL-SCNC: 98 MMOL/L (ref 98–107)
CMV IGM TITR SERPL: < 30 AU/ML (ref 0–29.9)
CO2 SERPL-SCNC: 25 MMOL/L (ref 21–32)
CREAT SERPL-MCNC: 1.4 MG/DL (ref 0.55–1.3)
DEPRECATED RDW RBC AUTO: 14.3 % (ref 11.6–15.6)
EBV VCA IGM SER-ACNC: <36 U/ML (ref 0–35.9)
EOSINOPHIL # BLD: 0.7 % (ref 0–4.5)
GLUCOSE SERPL-MCNC: 248 MG/DL (ref 74–106)
HCT VFR BLD CALC: 40.1 % (ref 32.4–45.2)
HGB BLD-MCNC: 12.8 GM/DL (ref 10.7–15.3)
INR BLD: 1.66 (ref 0.83–1.09)
IRON SERPL-MCNC: 22 UG/DL (ref 50–175)
LYMPHOCYTES # BLD: 7.5 % (ref 8–40)
MCH RBC QN AUTO: 26.9 PG (ref 25.7–33.7)
MCHC RBC AUTO-ENTMCNC: 31.8 G/DL (ref 32–36)
MCV RBC: 84.4 FL (ref 80–96)
MONOCYTES # BLD AUTO: 6.5 % (ref 3.8–10.2)
NEUTROPHILS # BLD: 85 % (ref 42.8–82.8)
PLATELET # BLD AUTO: 268 K/MM3 (ref 134–434)
PMV BLD: 8.5 FL (ref 7.5–11.1)
POTASSIUM SERPLBLD-SCNC: 3.9 MMOL/L (ref 3.5–5.1)
PROT SERPL-MCNC: 6.7 G/DL (ref 6.4–8.2)
PT PNL PPP: 19.7 SEC (ref 9.7–13)
RBC # BLD AUTO: 4.75 M/MM3 (ref 3.6–5.2)
SODIUM SERPL-SCNC: 132 MMOL/L (ref 136–145)
TIBC SERPL-MCNC: 498 UG/DL (ref 250–450)
WBC # BLD AUTO: 11.8 K/MM3 (ref 4–10)

## 2019-11-16 RX ADMIN — HEPARIN SODIUM SCH UNIT: 5000 INJECTION, SOLUTION INTRAVENOUS; SUBCUTANEOUS at 22:14

## 2019-11-16 RX ADMIN — HEPARIN SODIUM SCH UNIT: 5000 INJECTION, SOLUTION INTRAVENOUS; SUBCUTANEOUS at 06:39

## 2019-11-16 RX ADMIN — CARVEDILOL SCH MG: 3.12 TABLET, FILM COATED ORAL at 10:10

## 2019-11-16 RX ADMIN — CARVEDILOL SCH MG: 3.12 TABLET, FILM COATED ORAL at 22:14

## 2019-11-16 RX ADMIN — FUROSEMIDE SCH MG: 10 INJECTION, SOLUTION INTRAVENOUS at 10:10

## 2019-11-16 RX ADMIN — HEPARIN SODIUM SCH UNIT: 5000 INJECTION, SOLUTION INTRAVENOUS; SUBCUTANEOUS at 14:10

## 2019-11-16 RX ADMIN — ASPIRIN 81 MG SCH MG: 81 TABLET ORAL at 10:10

## 2019-11-16 NOTE — PN
Progress Note (short form)





- Note


Progress Note: 





Pt reports feeling better. Denies nausea, any GI symptoms.


 Hepatic Panel











Total Bilirubin  1.2 mg/dL (0.2-1)  H  11/16/19  06:20    


 


Direct Bilirubin  0.7 mg/dL (0.0-0.2)  H  11/16/19  06:20    


 


AST  > 1000 U/L (15-37)  H  11/16/19  06:20    


 


ALT  > 1000 U/L (13-61)  H  11/16/19  06:20    


 


Alkaline Phosphatase  97 U/L ()   11/16/19  06:20    


 


Albumin  3.4 g/dl (3.4-5.0)   11/16/19  06:20    








Aminotransferases still markedly elevated but bilirubin stable -- if anything, 

is slightly lower (1.3->1.2).


The pattern of liver injury is consistent with hepatitis A. Her hepatitis A IgM 

antibody was reported as "indeterminate". If it is repeated as an outpatient 

and comes back positive that would make hepatitis A likely.


Ms Toledo says that she recently ate out ("[employer]") and also will 

frequently order pizza for delivery/takeout.

## 2019-11-16 NOTE — PN
Teaching Attending Note


Name of Resident: Abel Navarro





ATTENDING PHYSICIAN STATEMENT





I saw and evaluated the patient.


I reviewed the resident's note and discussed the case with the resident.


I agree with the resident's findings and plan as documented.








SUBJECTIVE:


no fever or chills. feels tired but better than yesterday. no ABd pain ,no SOB 

. more history obtained. sx started with sore throat and fatigue x 1 week ago. 

then few days later she started having fever . amoxi was started after the 

fever started .





OBJECTIVE:


NAD, awake, alert, cooperative 


HEENT: dry MM. + JVD . 


CV: RRR, no MRG 


Lungs: CTAB , decreased breath sounds at bases 


Abd: soft, NT, ND, NL BS ,liver is not palpable and not percussable 


Ext: No edema , no erythema


 





ASSESSMENT AND PLAN:





87 y/o lady with h/o HTN, HLP, Hysterectomy who presented with fever , chills x 

1 -2 weeks and was found to have transaminitis . 








1- New onset acute systolic heart failure: with severely reduced EF. 


- cause is unknown. ? viral cardiomyositis . need to r/o ischemic 

cardiomyopathy. 


- cont lasix 


- cont coreg, toelrated that . 


- will d/w card the need fro life vest befoer dc given her EF is 20-25%


- will need R/L heart cath at some point 


- hold ACE/ARB for now 


- check resp viral panel


- place foely for I&O


- appreciate Card help 





2- Acute transaminitis: still uncelar etiolgoy. ? congestive hepatopathy, VS 

viral ( hep A) , Vs. autoimmune 


Liver function improved. Still with no tenderness in RUQ  


- cont supportive measures


- CNV, Hep A, Hep C, Hep B serology noted. 


- F/u EBV, rest of HEp B , HARISH, ASMA, and rest of serology 


- monitor LFTS 


- cont to hold statin and fibrates 


- avoid tylenol 








3- MASSIEL : probably prerenal azotemia due to decreased perfusion of kidneys in 

setting of heart failure 


- cont to monitor with diuresis


- avoid NSIADS


- hold losartan 





4- SIRS , no source of bacterial infection. 


- hold off abx 


- follow blood cx ( neg to date ) 


 


5- H/o HTN: hold losratan due to MASSIEL. monitor BP 





6- DVT PX : heparin SQ

## 2019-11-16 NOTE — PN
Physical Exam: 


SUBJECTIVE: Patient seen and examined in the morning. No acute events 

overnight. No complaints of chest pain, shortness of breath, abdominal pain, 

nausea, vomiting, diarrhea. 








OBJECTIVE:





 Vital Signs











 Period  Temp  Pulse  Resp  BP Sys/Reyes  Pulse Ox


 


 Last 24 Hr  97.3 F-99.2 F    17-20  121-148/72-88  99











GENERAL: The patient is awake, alert, and fully oriented, in no acute distress.


HEAD: Normal with no signs of trauma.


EYES: Extraocular movements intact, sclera anicteric, conjunctiva clear. 


LUNGS: Breath sounds equal, clear to auscultation bilaterally, no wheezes, no 

crackles, no 


accessory muscle use. 


HEART: Regular rate and rhythm, S1, S2 without murmur, rub or gallop.


ABDOMEN: Soft, nontender, nondistended, normoactive bowel sounds, no guarding.


EXTREMITIES: 2+ pulses, warm, well-perfused, no edema. 


NEUROLOGICAL: Cranial nerves II through XII grossly intact. Normal speech














 Laboratory Results - last 24 hr











  11/14/19 11/15/19 11/15/19





  14:39 07:00 11:28


 


WBC   


 


RBC   


 


Hgb   


 


Hct   


 


MCV   


 


MCH   


 


MCHC   


 


RDW   


 


Plt Count   


 


MPV   


 


Absolute Neuts (auto)   


 


Neutrophils %   


 


Lymphocytes %   


 


Monocytes %   


 


Eosinophils %   


 


Basophils %   


 


Nucleated RBC %   


 


PT with INR   


 


INR   


 


Sodium   


 


Potassium   


 


Chloride   


 


Carbon Dioxide   


 


Anion Gap   


 


BUN   


 


Creatinine   


 


Est GFR (CKD-EPI)AfAm   


 


Est GFR (CKD-EPI)NonAf   


 


Random Glucose   


 


Lactic Acid   


 


Calcium   


 


Iron   


 


TIBC   


 


Iron Saturation   


 


Unsaturated IBC   


 


Ferritin   


 


Total Bilirubin   


 


Direct Bilirubin   


 


AST   2755 H 


 


ALT   


 


Alkaline Phosphatase   


 


Ammonia   


 


Total Protein   


 


Albumin   


 


Opiates Screen    Negative


 


Methadone Screen    Negative


 


Barbiturate Screen    Negative


 


Phencyclidine Screen    Negative


 


Ur Amphetamines Screen    Negative


 


MDMA (Ecstasy) Screen    Negative


 


Benzodiazepines Screen    Negative


 


Cocaine Screen    Negative


 


U Marijuana (THC) Screen    Negative


 


CMV IgG Ab   


 


CMV IgM Ab   


 


Hep A IgM Ab Confirm  Indeterminate H  


 


Hep Bs Antigen  Negative  


 


Hep B Core IgM Ab  Negative  


 


Hepatitis C Ab (EIA)  0.1  














  11/15/19 11/15/19 11/16/19





  11:45 11:45 06:20


 


WBC   


 


RBC   


 


Hgb   


 


Hct   


 


MCV   


 


MCH   


 


MCHC   


 


RDW   


 


Plt Count   


 


MPV   


 


Absolute Neuts (auto)   


 


Neutrophils %   


 


Lymphocytes %   


 


Monocytes %   


 


Eosinophils %   


 


Basophils %   


 


Nucleated RBC %   


 


PT with INR   


 


INR   


 


Sodium   


 


Potassium   


 


Chloride   


 


Carbon Dioxide   


 


Anion Gap   


 


BUN   


 


Creatinine   


 


Est GFR (CKD-EPI)AfAm   


 


Est GFR (CKD-EPI)NonAf   


 


Random Glucose   


 


Lactic Acid  5.7 H*   4.1 H*


 


Calcium   


 


Iron   


 


TIBC   


 


Iron Saturation   


 


Unsaturated IBC   


 


Ferritin   


 


Total Bilirubin   


 


Direct Bilirubin   


 


AST   


 


ALT   


 


Alkaline Phosphatase   


 


Ammonia   


 


Total Protein   


 


Albumin   


 


Opiates Screen   


 


Methadone Screen   


 


Barbiturate Screen   


 


Phencyclidine Screen   


 


Ur Amphetamines Screen   


 


MDMA (Ecstasy) Screen   


 


Benzodiazepines Screen   


 


Cocaine Screen   


 


U Marijuana (THC) Screen   


 


CMV IgG Ab   > 10.00 H 


 


CMV IgM Ab   < 30.0 


 


Hep A IgM Ab Confirm   


 


Hep Bs Antigen   


 


Hep B Core IgM Ab   


 


Hepatitis C Ab (EIA)   














  11/16/19 11/16/19 11/16/19





  06:20 06:20 06:20


 


WBC   


 


RBC   


 


Hgb   


 


Hct   


 


MCV   


 


MCH   


 


MCHC   


 


RDW   


 


Plt Count   


 


MPV   


 


Absolute Neuts (auto)   


 


Neutrophils %   


 


Lymphocytes %   


 


Monocytes %   


 


Eosinophils %   


 


Basophils %   


 


Nucleated RBC %   


 


PT with INR   19.70 H 


 


INR   1.66 H 


 


Sodium   


 


Potassium   


 


Chloride   


 


Carbon Dioxide   


 


Anion Gap   


 


BUN   


 


Creatinine   


 


Est GFR (CKD-EPI)AfAm   


 


Est GFR (CKD-EPI)NonAf   


 


Random Glucose   


 


Lactic Acid   


 


Calcium   


 


Iron   


 


TIBC   


 


Iron Saturation   


 


Unsaturated IBC   


 


Ferritin   


 


Total Bilirubin  1.3 H  


 


Direct Bilirubin  0.7 H  


 


AST  > 1000 H  


 


ALT  > 1000 H  


 


Alkaline Phosphatase  104  


 


Ammonia    26.30


 


Total Protein  6.7  


 


Albumin  3.4  


 


Opiates Screen   


 


Methadone Screen   


 


Barbiturate Screen   


 


Phencyclidine Screen   


 


Ur Amphetamines Screen   


 


MDMA (Ecstasy) Screen   


 


Benzodiazepines Screen   


 


Cocaine Screen   


 


U Marijuana (THC) Screen   


 


CMV IgG Ab   


 


CMV IgM Ab   


 


Hep A IgM Ab Confirm   


 


Hep Bs Antigen   


 


Hep B Core IgM Ab   


 


Hepatitis C Ab (EIA)   














  11/16/19 11/16/19 11/16/19





  06:20 06:20 09:00


 


WBC   11.8 H 


 


RBC   4.75 


 


Hgb   12.8 


 


Hct   40.1 


 


MCV   84.4 


 


MCH   26.9 


 


MCHC   31.8 L 


 


RDW   14.3 


 


Plt Count   268 


 


MPV   8.5 


 


Absolute Neuts (auto)   10.1 H 


 


Neutrophils %   85.0 H 


 


Lymphocytes %   7.5 L D 


 


Monocytes %   6.5 


 


Eosinophils %   0.7 


 


Basophils %   0.3 


 


Nucleated RBC %   1 H 


 


PT with INR   


 


INR   


 


Sodium  132 L  


 


Potassium  3.9  


 


Chloride  98  


 


Carbon Dioxide  25  


 


Anion Gap  9  


 


BUN  26.6 H  


 


Creatinine  1.4 H  


 


Est GFR (CKD-EPI)AfAm  38.78  


 


Est GFR (CKD-EPI)NonAf  33.46  


 


Random Glucose  248 H  


 


Lactic Acid    4.3 H*


 


Calcium  8.5  


 


Iron  22 L  


 


TIBC  498 H  


 


Iron Saturation  4 L  


 


Unsaturated IBC  476 H  


 


Ferritin  60.5  


 


Total Bilirubin  1.2 H  


 


Direct Bilirubin   


 


AST  > 1000 H  


 


ALT  > 1000 H  


 


Alkaline Phosphatase  97  


 


Ammonia   


 


Total Protein  6.4  


 


Albumin  3.4  


 


Opiates Screen   


 


Methadone Screen   


 


Barbiturate Screen   


 


Phencyclidine Screen   


 


Ur Amphetamines Screen   


 


MDMA (Ecstasy) Screen   


 


Benzodiazepines Screen   


 


Cocaine Screen   


 


U Marijuana (THC) Screen   


 


CMV IgG Ab   


 


CMV IgM Ab   


 


Hep A IgM Ab Confirm   


 


Hep Bs Antigen   


 


Hep B Core IgM Ab   


 


Hepatitis C Ab (EIA)   








Active Medications











Generic Name Dose Route Start Last Admin





  Trade Name Freq  PRN Reason Stop Dose Admin


 


Aspirin  81 mg  11/16/19 10:00  11/16/19 10:10





  Asa -  PO   81 mg





  DAILY [facility]   Administration





     





     





     





     


 


Carvedilol  3.125 mg  11/15/19 22:00  11/16/19 10:10





  Coreg -  PO   3.125 mg





  BID [facility]   Administration





     





     





     





     


 


Furosemide  40 mg  11/15/19 16:00  11/16/19 10:10





  Lasix Injection -  IVPUSH   40 mg





  DAILY [facility]   Administration





     





     





     





     


 


Heparin Sodium (Porcine)  5,000 unit  11/15/19 06:00  11/16/19 06:39





  Heparin -  SQ   5,000 unit





  TID [facility]   Administration





     





     





     





     


 


Paroxetine HCl  40 mg  11/15/19 15:00  11/16/19 10:11





  Paxil -  PO   40 mg





  DAILY [facility]   Administration





     





     





     





     


 


Zolpidem Tartrate  5 mg  11/15/19 15:13  





  Ambien -  PO   





  HS PRN   





  [facility]   





     





     





     











ASSESSMENT/PLAN:


88 F with PMH of HTN, HLD, presented with weakness and shortness of breath with 

transaminitis. 








1) Transaminitis from Drug Induced Liver Injury vs Shock Liver


-LFTs on admission AST: 3495, ALT: 2170. Today AST: 1759. ALT: 1966. 


-RUQ U/S shows cholelithiasis without sonographic evidence of acute 

cholecystitis. No definite biliary tract dilatation. Complex 2.6 X 3.4 X 3 cm 

splenic cystic structure is seen with several areas of peripheral 

calcification. 


-MRI/MRCP pending


-CK level 158


-Possibly Hepatitis A, still pending labs


-CMV IGG positive, IGM negative


-HARISH/ASMA ordered


-Avoiding amxocillin


-Holding Fenofibrate and Statin


-GI consulted, appreciate recs. 





2)SIRS 


-Fever, with tachycardia,  with lactic acidosis


-Lactic acid down to 4.1 but up to 4.3 today. 


-Blood culture pending


-Urine culture growing Lactose fermenting gram negative bacilli but <10,000 and 

no urinary symptoms reported.


-ID consulted,appreciate recs





3)CHF


-BNP 6499


-Echo: EF of 20-25%


-Lasix 40 mg IV Daily started


-Carvedilol 3.125 mg PO BID


-Cardiology consulted, appreciate recs





4)MASSIEL


-Monitor Kidney Function


-Renal U/S doesn't show hydronephrosis


-Holding Losartan





5) Hx of HTN


-Holding Losartan





DVT Prophylaxis: Heparin SQ 5000 unit TID





F:No fluids


E: Trend BMP


N: Sodium controlled diet





Visit type





- Emergency Visit


Emergency Visit: Yes


ED Registration Date: 11/14/19


Care time: The patient presented to the Emergency Department on the above date 

and was hospitalized for further evaluation of their emergent condition.





- New Patient


This patient is new to me today: No





- Critical Care


Critical Care patient: No





ATTENDING PHYSICIAN STATEMENT





I saw and evaluated the patient.


I reviewed the resident's note and discussed the case with the resident.


I agree with the resident's findings and plan as documented.








SUBJECTIVE:








OBJECTIVE:








ASSESSMENT AND PLAN:

## 2019-11-17 LAB
ALBUMIN SERPL-MCNC: 3.1 G/DL (ref 3.4–5)
ALP SERPL-CCNC: 110 U/L (ref 45–117)
ALT SERPL-CCNC: 1764 U/L (ref 13–61)
ANION GAP SERPL CALC-SCNC: 11 MMOL/L (ref 8–16)
AST SERPL-CCNC: 1659 U/L (ref 15–37)
BASOPHILS # BLD: 0.7 % (ref 0–2)
BILIRUB CONJ SERPL-MCNC: 1.2 MG/DL (ref 0–0.2)
BILIRUB SERPL-MCNC: 2 MG/DL (ref 0.2–1)
BUN SERPL-MCNC: 32.6 MG/DL (ref 7–18)
CALCIUM SERPL-MCNC: 8.6 MG/DL (ref 8.5–10.1)
CHLORIDE SERPL-SCNC: 92 MMOL/L (ref 98–107)
CO2 SERPL-SCNC: 24 MMOL/L (ref 21–32)
CREAT SERPL-MCNC: 1.5 MG/DL (ref 0.55–1.3)
DEPRECATED RDW RBC AUTO: 14.3 % (ref 11.6–15.6)
EOSINOPHIL # BLD: 0.2 % (ref 0–4.5)
GLUCOSE SERPL-MCNC: 240 MG/DL (ref 74–106)
HCT VFR BLD CALC: 39.1 % (ref 32.4–45.2)
HGB BLD-MCNC: 12.5 GM/DL (ref 10.7–15.3)
INR BLD: 1.99 (ref 0.83–1.09)
LYMPHOCYTES # BLD: 11.4 % (ref 8–40)
MAGNESIUM SERPL-MCNC: 1.9 MG/DL (ref 1.8–2.4)
MCH RBC QN AUTO: 26.4 PG (ref 25.7–33.7)
MCHC RBC AUTO-ENTMCNC: 32 G/DL (ref 32–36)
MCV RBC: 82.7 FL (ref 80–96)
MONOCYTES # BLD AUTO: 8.9 % (ref 3.8–10.2)
NEUTROPHILS # BLD: 78.8 % (ref 42.8–82.8)
PHOSPHATE SERPL-MCNC: 2.8 MG/DL (ref 2.5–4.9)
PLATELET # BLD AUTO: 274 K/MM3 (ref 134–434)
PMV BLD: 8.6 FL (ref 7.5–11.1)
POTASSIUM SERPLBLD-SCNC: 4.8 MMOL/L (ref 3.5–5.1)
PROT SERPL-MCNC: 6.4 G/DL (ref 6.4–8.2)
PT PNL PPP: 23.7 SEC (ref 9.7–13)
RBC # BLD AUTO: 4.74 M/MM3 (ref 3.6–5.2)
SODIUM SERPL-SCNC: 127 MMOL/L (ref 136–145)
WBC # BLD AUTO: 14.3 K/MM3 (ref 4–10)

## 2019-11-17 RX ADMIN — FUROSEMIDE SCH MG: 10 INJECTION, SOLUTION INTRAVENOUS at 09:46

## 2019-11-17 RX ADMIN — HEPARIN SODIUM SCH UNIT: 5000 INJECTION, SOLUTION INTRAVENOUS; SUBCUTANEOUS at 15:00

## 2019-11-17 RX ADMIN — CARVEDILOL SCH MG: 3.12 TABLET, FILM COATED ORAL at 21:41

## 2019-11-17 RX ADMIN — HEPARIN SODIUM SCH UNIT: 5000 INJECTION, SOLUTION INTRAVENOUS; SUBCUTANEOUS at 21:41

## 2019-11-17 RX ADMIN — ASPIRIN 81 MG SCH MG: 81 TABLET ORAL at 09:45

## 2019-11-17 RX ADMIN — HEPARIN SODIUM SCH UNIT: 5000 INJECTION, SOLUTION INTRAVENOUS; SUBCUTANEOUS at 05:35

## 2019-11-17 RX ADMIN — CARVEDILOL SCH MG: 3.12 TABLET, FILM COATED ORAL at 09:46

## 2019-11-17 NOTE — PN
Progress Note (short form)





- Note


Progress Note: 





Denies any abdominal pain or nausea. Feels lethargic.


 INR, PTT











INR  1.99  (0.83-1.09)  H  11/17/19  06:20    








 Hepatic Panel











Total Bilirubin  2.0 mg/dL (0.2-1)  H  11/17/19  06:20    


 


Direct Bilirubin  1.2 mg/dL (0.0-0.2)  H  11/17/19  06:20    


 


AST  1659 U/L (15-37)  H  11/17/19  06:20    


 


ALT  1764 U/L (13-61)  H  11/17/19  06:20    


 


Alkaline Phosphatase  110 U/L ()   11/17/19  06:20    


 


Albumin  3.1 g/dl (3.4-5.0)  L  11/17/19  06:20    








Testing for Hepatitis B, acute CMV, acute EBV negative.


Hepatitis A IgM was indeterminate and still remains a possible diagnosis.


The rise in bilirubin and INR today is of concern and could be a sign that the 

aminotransferases are down, not because she is getting over the hepatic insult, 

but because she is running out of hepatocytes. 


Given that I have done 2 things:


1) stopped aspirin, as her INR is climbing and she denies any history of MI or 

stent


2) stopped paroxetine. Although it is unlikely to be the cause of her liver 

disease liver toxicity from paroxetine is not unknown.


Liver toxicity from furosemide or carvedilol is exceedingly rare and I would 

not stop those drugs now.


Continue to monitor LFTs and INR.

## 2019-11-17 NOTE — PN
Progress Note (short form)





- Note


Progress Note: 





constipated





mentally improving per daughter





 Vital Signs











 Period  Temp  Pulse  Resp  BP Sys/Reyes  Pulse Ox


 


 Last 24 Hr  97.5 F-99 F  72-93  18-20  110-130/65-77  








cor-rrr


lungs clear


abd soft, nt


ext no edema





 CBC, BMP





 11/17/19 06:20 





 11/17/19 06:20 





 Microbiology





11/14/19 12:50   Blood - Peripheral Venous   Blood Culture - Preliminary


                            NO GROWTH OBTAINED AFTER 72 HOURS, INCUBATION TO 

CONTINUE


                            FOR 2 DAYS.


11/14/19 12:50   Blood - Peripheral Venous   Blood Culture - Preliminary


                            NO GROWTH OBTAINED AFTER 72 HOURS, INCUBATION TO 

CONTINUE


                            FOR 2 DAYS.


11/14/19 12:50   Urine - Urine Clean Catch   Urine Culture - Final


                            Lactose Fermenting Neg Bacilli


 Laboratory Tests











  11/15/19 11/15/19 11/16/19





  07:00 07:00 06:20


 


Total Bilirubin   1.6 H 


 


Direct Bilirubin   1.0 H 


 


AST   > 2002 H 


 


ALT   2175 H  > 1000 H


 


Alkaline Phosphatase   90 


 


Hep A IgM Ab Confirm  Indeterminate H  


 


Hepatitis A Ab Total  Positive H  














  11/17/19





  06:20


 


Total Bilirubin 


 


Direct Bilirubin 


 


AST  1659 H


 


ALT  1764 H


 


Alkaline Phosphatase 


 


Hep A IgM Ab Confirm 


 


Hepatitis A Ab Total 











 Current Medications





Carvedilol (Coreg -)  3.125 mg PO BID [facility]


   Last Admin: 11/17/19 09:46 Dose:  3.125 mg


Furosemide (Lasix Injection -)  40 mg IVPUSH DAILY [facility]


   Last Admin: 11/17/19 09:46 Dose:  40 mg


Heparin Sodium (Porcine) (Heparin -)  5,000 unit SQ TID [facility]


   Last Admin: 11/17/19 05:35 Dose:  5,000 unit





a/p


hepatitis- ?hep A


transaminases trending down but inr is up which is concerning


will follow


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





heart failure- per cardiology





constipation





d/w daughter at [facility]

## 2019-11-17 NOTE — CON.PSY
Psychiatry Consult


Chief Complaint: None- Asked to see this patient for a hx of Depression


History of Present Problem: 





 Patient is currently on Paxil 40 mgs for ' as long as she can remember!" she 

says.


She denies depressive symptoms, only a bit worried about her health and her SOB.


Admits that she feels 'better; and less  SOB now





[place] hx of psychiatric hospitalization


No hx of care from mental health


receives Paxil from PCP


No hx of suicidal attempts


 cc " I have  sore throat!"


Symptoms: denies: Depressed Mood, Anhedonia, Worthlessness/Guilt, Decreased 

Energy, Suicidality, Self destructive thoughts, Appetite Disturbance, Weight 

change, Hopelessness, Sleep Disturbance, Diurnal Mood Changes, Impaired 

Concentration, Decreased Motivation, Memory Impairment, Irritability, Expansive 

/ Elevated Mood, Grandiosity, Hyper-religiosity, Excessive Energy, Racing 

Thoughts, Anxiety, Panic Attacks, Obsessive Thoughts, Flashbacks, Compulsive 

Behaviors, Agoraphobia, Restlessness, Phobias, Bulimic Behavior, Anorexic 

Behavior, Somatic Symptoms, Sexual Dysfunction, Inability to Control Temper, 

Aggressivity, Impulsivity, Depersonalization, Derealization, Amnesic Episodes, 

Disorganized/Disruptive Thoughts, Delusions, Hallucinations, Paranoia, Conduct 

Problems, Oppositionalism, Attention Deficit, Learning Problems, Firesetting, 

Enuresis, Lying, Hyperactivity, Other





- Current Medications


Current Medications: 


Active Medications





Aspirin (Asa -)  81 mg PO DAILY [facility]


   Last Admin: 11/17/19 09:45 Dose:  81 mg


Carvedilol (Coreg -)  3.125 mg PO BID [facility]


   Last Admin: 11/17/19 09:46 Dose:  3.125 mg


Furosemide (Lasix Injection -)  40 mg IVPUSH DAILY [facility]


   Last Admin: 11/17/19 09:46 Dose:  40 mg


Heparin Sodium (Porcine) (Heparin -)  5,000 unit SQ TID [facility]


   Last Admin: 11/17/19 05:35 Dose:  5,000 unit


Paroxetine HCl (Paxil -)  40 mg PO DAILY [facility]


   Last Admin: 11/17/19 09:46 Dose:  40 mg











- Allergies


Allergies: 


 Allergies











Allergy/AdvReac Type Severity Reaction Status Date / Time


 


metoclopramide [From Reglan] Allergy   Verified 11/14/19 11:40


 


prochlorperazine Allergy   Verified 11/14/19 11:39





[From Compazine]     














- Current Living Status


Usual Living Arrangement: Other (lives with daughter)





- Affect


Affect: Other (appropriate neutral)





- Mood


Mood: Other (neutral)





- Speech/Language


Expressive: Coherent


Receptive: Age Appropriate Comprehension of Spoken Words





- Psychomotor Activity


Psychomotor Activity: Normal





- Thought Process


Thought Process: Intact





- Thought Content


Hallucinations: Absent


Delusions: Absent





- Cognition


Attention: Alert


Orientation: Time, Person, Place


Memory, Immediate Recall: Intact


Memory, Short Term: 2/3





- Concentration


Serial Sevens Intact: No


Simple Calculations Intact: Yes





- Abstraction


Proverb Interpretation: Intact


Judgement: Intact





- Insight


Insight: Intact





- Impulse Control


Impulse Control: Good Control





- Suicidal Ideation


Suicidal Ideation: No





- Homicidal Ideation


Homicidal Ideation: No





Assessment/Plan





Patient with history of Depression and is currently on Paxil





Continue Paxil-

## 2019-11-17 NOTE — PN
Progress Note (short form)





- Note


Progress Note: 





Subjective:


no fever or chills. No HA , no pain . 





Objective:








Vital Signs:


NAD, awake, alert, cooperative 


HEENT: MMM + JVD . 


CV: RRR, no MRG 


Lungs: CTAB , decreased breath sounds at bases 


Abd: soft, NT, ND, NL BS 


Ext: No edema , no erythema


 





ASSESSMENT AND PLAN:





87 y/o lady with h/o HTN, HLP, Hysterectomy who presented with fever , chills x 

1 -2 weeks and was found to have transaminitis . 








1- New onset acute systolic heart failure: with severely reduced EF. 


- cause is unknown. ? viral cardiomyositis . need to r/o ischemic 

cardiomyopathy. 


- cont lasix 


- cont coreg


- will d/w card the need for life vest before dc given her EF is 20-25%


- will need R/L heart cath at some point 


- hold ACE/ARB for now 


- check resp viral panel 





2- Acute transaminitis: still uncelar etiolgoy. ? congestive hepatopathy, VS 

viral ( hep A) Vs other


Bili is higher today 


- cont supportive measures


- CMV, Hep A, Hep C, Hep B serology noted. 


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 


- HARISH, ASMA neg 


- monitor LFTS closely   


- cont to hold statin and fibrates 


- paroxitine was stopped by GI. will d/w GI the need for taper to avoid 

withdrawal side effects especially seizures 








3- MASSIEL: probably prerenal azotemia due to decreased perfusion of kidneys in 

setting of heart failure 


- cont to monitor with diuresis


- hold losartan 





4- SIRS, no source of bacterial infection. 


- hold off abx 


- follow blood cx ( neg to date ) 


 


5- H/o HTN: hold losratan due to MASSIEL. monitor BP 





6- DVT PX : heparin SQ




















Visit type





- Emergency Visit


Emergency Visit: Yes


ED Registration Date: 11/14/19


Care time: The patient presented to the Emergency Department on the above date 

and was hospitalized for further evaluation of their emergent condition.





- New Patient


This patient is new to me today: No





- Critical Care


Critical Care patient: No

## 2019-11-18 LAB
ALBUMIN SERPL-MCNC: 3.1 G/DL (ref 3.4–5)
ALP SERPL-CCNC: 111 U/L (ref 45–117)
ALT SERPL-CCNC: 1666 U/L (ref 13–61)
ANION GAP SERPL CALC-SCNC: 12 MMOL/L (ref 8–16)
ANION GAP SERPL CALC-SCNC: 7 MMOL/L (ref 8–16)
AST SERPL-CCNC: 1523 U/L (ref 15–37)
BASOPHILS # BLD: 0.4 % (ref 0–2)
BILIRUB CONJ SERPL-MCNC: 1.4 MG/DL (ref 0–0.2)
BILIRUB SERPL-MCNC: 2 MG/DL (ref 0.2–1)
BUN SERPL-MCNC: 38 MG/DL (ref 7–18)
BUN SERPL-MCNC: 41.4 MG/DL (ref 7–18)
CALCIUM SERPL-MCNC: 7.7 MG/DL (ref 8.5–10.1)
CALCIUM SERPL-MCNC: 8.7 MG/DL (ref 8.5–10.1)
CHLORIDE SERPL-SCNC: 87 MMOL/L (ref 98–107)
CHLORIDE SERPL-SCNC: 90 MMOL/L (ref 98–107)
CO2 SERPL-SCNC: 24 MMOL/L (ref 21–32)
CO2 SERPL-SCNC: 26 MMOL/L (ref 21–32)
CREAT SERPL-MCNC: 1.2 MG/DL (ref 0.55–1.3)
CREAT SERPL-MCNC: 1.3 MG/DL (ref 0.55–1.3)
DEPRECATED RDW RBC AUTO: 14.5 % (ref 11.6–15.6)
EOSINOPHIL # BLD: 0.1 % (ref 0–4.5)
GLUCOSE SERPL-MCNC: 220 MG/DL (ref 74–106)
GLUCOSE SERPL-MCNC: 302 MG/DL (ref 74–106)
HCT VFR BLD CALC: 39.7 % (ref 32.4–45.2)
HGB BLD-MCNC: 12.7 GM/DL (ref 10.7–15.3)
INR BLD: 1.87 (ref 0.83–1.09)
LDH1 CFR SERPL ELPH: 7 % (ref 17–32)
LDH2 CFR SERPL ELPH: 13 % (ref 25–40)
LDH3 CFR SERPL ELPH: 11 % (ref 17–27)
LDH4 CFR SERPL ELPH: 8 % (ref 5–13)
LDH5 CFR SERPL ELPH: 61 % (ref 4–20)
LYMPHOCYTES # BLD: 12 % (ref 8–40)
MCH RBC QN AUTO: 26.6 PG (ref 25.7–33.7)
MCHC RBC AUTO-ENTMCNC: 32 G/DL (ref 32–36)
MCV RBC: 82.9 FL (ref 80–96)
MONOCYTES # BLD AUTO: 9.8 % (ref 3.8–10.2)
NEUTROPHILS # BLD: 77.7 % (ref 42.8–82.8)
PLATELET # BLD AUTO: 270 K/MM3 (ref 134–434)
PMV BLD: 8.9 FL (ref 7.5–11.1)
POTASSIUM SERPLBLD-SCNC: 4 MMOL/L (ref 3.5–5.1)
POTASSIUM SERPLBLD-SCNC: 5.1 MMOL/L (ref 3.5–5.1)
PROT SERPL-MCNC: 6.1 G/DL (ref 6.4–8.2)
PT PNL PPP: 22.2 SEC (ref 9.7–13)
RBC # BLD AUTO: 4.78 M/MM3 (ref 3.6–5.2)
SODIUM SERPL-SCNC: 123 MMOL/L (ref 136–145)
SODIUM SERPL-SCNC: 124 MMOL/L (ref 136–145)
WBC # BLD AUTO: 14.7 K/MM3 (ref 4–10)

## 2019-11-18 RX ADMIN — HEPARIN SODIUM SCH UNIT: 5000 INJECTION, SOLUTION INTRAVENOUS; SUBCUTANEOUS at 13:21

## 2019-11-18 RX ADMIN — CARVEDILOL SCH MG: 3.12 TABLET, FILM COATED ORAL at 21:43

## 2019-11-18 RX ADMIN — HEPARIN SODIUM SCH UNIT: 5000 INJECTION, SOLUTION INTRAVENOUS; SUBCUTANEOUS at 21:42

## 2019-11-18 RX ADMIN — CARVEDILOL SCH MG: 3.12 TABLET, FILM COATED ORAL at 09:01

## 2019-11-18 RX ADMIN — FUROSEMIDE SCH MG: 10 INJECTION, SOLUTION INTRAVENOUS at 09:01

## 2019-11-18 RX ADMIN — HEPARIN SODIUM SCH UNIT: 5000 INJECTION, SOLUTION INTRAVENOUS; SUBCUTANEOUS at 05:25

## 2019-11-18 NOTE — PN
Teaching Attending Note


Name of Resident: Hetal Sommers





ATTENDING PHYSICIAN STATEMENT





I saw and evaluated the patient.


I reviewed the resident's note and discussed the case with the resident.


I agree with the resident's findings and plan as documented.








SUBJECTIVE:





no pain , no fever or chills. No pain . No HA . no SOB 


OBJECTIVE:


NAD, awake, alert, cooperative 


HEENT: MMM + JVD. 


CV: RRR, no MRG 


Lungs: b/l basilar crackles 


Abd: soft, NT, ND, NL BS 


Ext: No edema , no erythema


 





ASSESSMENT AND PLAN:





89 y/o lady with h/o HTN, HLP, Hysterectomy who presented with fever , chills x 

1 -2 weeks and was found to have transaminitis . 








1- New onset acute systolic heart failure: with severely reduced EF. 


- cause is unknown. ? viral cardiomyositis. need to r/o ischemic 

cardiomyopathy. 


- cont lasix as she continue to look volume overloaded  


- cont coreg


- will d/w card the need for life vest before dc given her EF is 20-25%


- will need R/L heart cath at some point 


- hold ACE/ARB for now. 


- RVP pending 





2- Acute transaminitis: still uncelar etiolgoy? congestive hepatopathy, VS 

viral ( hep A) Vs other


stable LFTS 


- cont supportive measures 


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 d/w ID    


- cont to hold statin and fibrates 


- Will resume Proxitine at a lower dose 30 . d/w Dr. Dupree  








3- MASSIEL: probably prerenal azotemia( cardio-renal sx ) . It improved with 

diuresis , and itis unlikely to be hepatorenal sx. 


case d/w Dr. Dupree who is concerned about hepatorenal sx 


- will get renal eval 


- monitor NA level with diuresis , pt is asymptomatic  


- cont to hold losartan 





4- SIRS, no source of bacterial infection. 


- hold off abx 


- follow blood cx ( neg to date ) 


 


5- H/o HTN: hold losratan due to MASSIEL. cont coreg .monitor BP 





6- DVT PX : heparin SQ











ASSESSMENT AND PLAN:

## 2019-11-18 NOTE — CONSULT
Consult


Consult Specialty:: Nephrology


Reason for Consultation:: hyponatremia





- History of Present Illness


Chief Complaint: shortness of breath


History of Present Illness: 





Pt is an 88 year old female with pmhx of htn, chf and hld who presents with 

shortness of breath. She was initially given fluids. She later developed 

shortness of breath and was started on lasix. The diuretics did help her 

symptoms. I was called to evaluate her for hyponatremia. She denies headache or 

change in vision.. She denies fevers or chills. She denies dysuria or 

hematuria. Family are at bedside and assisted with history. 





- History Source


History Provided By: Patient, Medical Record





- Past Medical History


Cardio/Vascular: Yes: HTN, Hyperlipdemia


...Pregnant: No





- Past Surgical History


Past Surgical History: Yes: Hysterectomy (ROXANA/BSO)





- Alcohol/Substance Use


Hx Alcohol Use: No


History of Substance Use: reports: None





- Smoking History


Smoking history: Never smoked


Have you smoked in the past 12 months: No





- Social History


Usual Living Arrangement: With Child


ADL: Independent


Occupation: Retired 


History of Recent Travel: No





Home Medications





- Allergies


Allergies/Adverse Reactions: 


 Allergies











Allergy/AdvReac Type Severity Reaction Status Date / Time


 


metoclopramide [From Reglan] Allergy   Verified 11/14/19 11:40


 


prochlorperazine Allergy   Verified 11/14/19 11:39





[From Compazine]     














- Home Medications


Home Medications: 


Ambulatory Orders





Amoxicillin - [Amoxicillin 500mg Capsule -] 500 mg PO TID 11/14/19 


Aspirin [Children's Aspirin] 81 mg PO DAILY 11/14/19 


Fenofibrate Nanocrystallized [Fenofibrate] 145 mg PO DAILY 11/14/19 


Losartan Potassium 50 mg PO DAILY 11/14/19 


Lovastatin 20 mg PO DAILY 11/14/19 


Paroxetine HCl 40 mg PO DAILY 11/14/19 


Zolpidem Tartrate 10 mg PO DAILY 11/14/19 











Family Medical History


Family History: Denies





Review of Systems





- Review of Systems


Constitutional: reports: Malaise


Eyes: reports: No Symptoms


HENT: reports: No Symptoms


Neck: reports: No Symptoms


Cardiovascular: reports: Edema


Respiratory: reports: SOB, SOB on Exertion


Gastrointestinal: reports: No Symptoms


Genitourinary: reports: No Symptoms


Musculoskeletal: reports: No Symptoms


Integumentary: reports: No Symptoms


Neurological: reports: No Symptoms


Endocrine: reports: No Symptoms


Hematology/Lymphatic: reports: No Symptoms


Psychiatric: reports: No Symptoms





Physical Exam


Vital Signs: 


 Vital Signs











Temperature  97.8 F   11/18/19 14:36


 


Pulse Rate  72   11/18/19 14:36


 


Respiratory Rate  20   11/18/19 14:36


 


Blood Pressure  107/63   11/18/19 14:36


 


O2 Sat by Pulse Oximetry (%)  100   11/18/19 09:00











Constitutional: Yes: Calm


Eyes: Yes: Conjunctiva Clear


HENT: Yes: Atraumatic


Neck: Yes: Supple


Cardiovascular: Yes: S1, S2


Respiratory: Yes: CTA Bilaterally


Gastrointestinal: Yes: Soft


Renal/: Yes: WNL


Musculoskeletal: Yes: WNL


Edema: Yes


Edema: LLE: Trace, RLE: Trace


Neurological: Yes: Oriented


Psychiatric: Yes: Oriented


Labs: 


 CBC, BMP





 11/18/19 06:40 





 11/18/19 12:41 





 Laboratory Tests











  11/15/19 11/16/19 11/17/19





  07:00 06:20 06:20


 


Sodium   132 L  127 L


 


Potassium   


 


HARISH Screen  Negative  














  11/18/19 11/18/19





  06:40 12:41


 


Sodium  123 L  124 L


 


Potassium   4.0


 


HARISH Screen  














Imaging





- Results


Chest X-ray: Report Reviewed





Problem List





- Problems


(1) Hyponatremia


Code(s): E87.1 - HYPO-OSMOLALITY AND HYPONATREMIA   





(2) Abnormal liver enzymes


Code(s): R74.8 - ABNORMAL LEVELS OF OTHER SERUM ENZYMES   





(3) New onset of congestive heart failure


Code(s): I50.9 - HEART FAILURE, UNSPECIFIED   





Assessment/Plan





 Current Medications











Generic Name Dose Route Start Last Admin





  Trade Name Freq  PRN Reason Stop Dose Admin


 


Carvedilol  3.125 mg  11/15/19 22:00  11/18/19 09:01





  Coreg -  PO   3.125 mg





  BID KARLA   Administration





     





     





     





     


 


Furosemide  40 mg  11/15/19 16:00  11/18/19 09:01





  Lasix Injection -  IVPUSH   40 mg





  DAILY KARLA   Administration





     





     





     





     


 


Glycerin  1 each  11/18/19 15:18  





  Glycerin Suppository Adult -  [place]   





  DAILY PRN   





  CONSTIPATION   





     





     





     


 


Heparin Sodium (Porcine)  5,000 unit  11/15/19 06:00  11/18/19 13:21





  Heparin -  SQ   5,000 unit





  TID KARLA   Administration





     





     





     





     


 


Paroxetine HCl  30 mg  11/19/19 10:00  





  Paxil -  PO   





  DAILY KARLA   





     





     





     





     








Impression


1. hyponatremia


2. chf


3. transaminitis


4. hld





Plan


- cont with lasix


- restrict free water


- etiology of hyponatremia likely chf howeve will order workup


- check serum and urine osm


- check urine sodium


- discussed with medical team


- avoid fluids


- will follow

## 2019-11-18 NOTE — PN
Progress Note (short form)





- Note


Progress Note: 





feels well


eating lunch


  Vital Signs











 Period  Temp  Pulse  Resp  BP Sys/Reyes  Pulse Ox


 


 Last 24 Hr  97.4 F-98.9 F  72-93  17-20  107-129/59-79  100-100








cor-rrr


lungs decreased bs at bases


abd soft,nt


ext no edema





 CBC, BMP





 11/18/19 06:40 





 11/18/19 12:41 





 Microbiology





11/14/19 12:50   Blood - Peripheral Venous   Blood Culture - Preliminary


                            NO GROWTH OBTAINED AFTER 96 HOURS, INCUBATION TO 

CONTINUE


                            FOR 1 DAYS.


11/14/19 12:50   Blood - Peripheral Venous   Blood Culture - Preliminary


                            NO GROWTH OBTAINED AFTER 96 HOURS, INCUBATION TO 

CONTINUE


                            FOR 1 DAYS.


11/14/19 12:50   Urine - Urine Clean Catch   Urine Culture - Final


                            Lactose Fermenting Neg Bacilli











a/p


hepatitis- ?hep A


transaminases trending down, inr is stable


will follow


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





heart failure- per cardiology





constipation





d/w infection control nurse- she will contact [facility]

## 2019-11-18 NOTE — PN
Physical Exam: 


SUBJECTIVE: Patient seen and examined. She reports HUTSON is improved when 

ambulating to and from bathroom. She denies chest pain, abdominal pain, n/v/d. 

She denies lower extremity leg swelling. This afternoon, pt reports sore throat 

and constipation.








OBJECTIVE:





 Vital Signs











 Period  Temp  Pulse  Resp  BP Sys/Reyes  Pulse Ox


 


 Last 24 Hr  97.4 F-98.9 F  72-93  17-20  107-129/59-79  100-100











GENERAL: The patient is awake, alert, and fully oriented, in no acute distress.


HEAD: Normal with no signs of trauma.


EYES: PERRL, extraocular movements intact, conjunctiva clear.


ENT: Ears normal, nares patent, moist mucous membranes.


NECK: Trachea midline, full range of motion, supple. 


LUNGS: Bilateral basilar crackles


HEART: Regular rate and rhythm, no murmur appreciated


ABDOMEN: Soft, nontender, nondistended, normoactive bowel sounds, no guarding


EXTREMITIES: Warm, well-perfused, no edema. 


NEUROLOGICAL: Cranial nerves II through XII grossly intact. Normal speech


PSYCH: Normal mood, normal affect.


SKIN: Warm, dry, normal turgor














 Laboratory Results - last 24 hr











  11/16/19 11/18/19 11/18/19





  06:20 06:40 06:40


 


WBC   


 


RBC   


 


Hgb   


 


Hct   


 


MCV   


 


MCH   


 


MCHC   


 


RDW   


 


Plt Count   


 


MPV   


 


Absolute Neuts (auto)   


 


Neutrophils %   


 


Lymphocytes %   


 


Monocytes %   


 


Eosinophils %   


 


Basophils %   


 


Nucleated RBC %   


 


PT with INR    22.20 H


 


INR    1.87 H


 


Sodium   123 L 


 


Potassium   5.1 


 


Chloride   90 L 


 


Carbon Dioxide   26 


 


Anion Gap   7 L 


 


BUN   41.4 H 


 


Creatinine   1.3 


 


Est GFR (CKD-EPI)AfAm   42.42 


 


Est GFR (CKD-EPI)NonAf   36.60 


 


Random Glucose   220 H 


 


Lactic Acid   


 


Calcium   8.7 


 


Total Bilirubin   2.0 H 


 


Direct Bilirubin   1.4 H 


 


AST   1523 H 


 


ALT   1666 H 


 


Alkaline Phosphatase   111 


 


Total Protein   6.1 L 


 


Albumin   3.1 L 


 


Tumor Marker AFP  2.9  














  11/18/19 11/18/19 11/18/19





  06:40 12:41 14:40


 


WBC  14.7 H  


 


RBC  4.78  


 


Hgb  12.7  


 


Hct  39.7  


 


MCV  82.9  


 


MCH  26.6  


 


MCHC  32.0  


 


RDW  14.5  


 


Plt Count  270  


 


MPV  8.9  


 


Absolute Neuts (auto)  11.4 H  


 


Neutrophils %  77.7  


 


Lymphocytes %  12.0  


 


Monocytes %  9.8  


 


Eosinophils %  0.1  


 


Basophils %  0.4  


 


Nucleated RBC %  1 H  


 


PT with INR   


 


INR   


 


Sodium   124 L 


 


Potassium   4.0 


 


Chloride   87 L 


 


Carbon Dioxide   24 


 


Anion Gap   12 


 


BUN   38.0 H 


 


Creatinine   1.2 


 


Est GFR (CKD-EPI)AfAm   46.73 


 


Est GFR (CKD-EPI)NonAf   40.32 


 


Random Glucose   302 H 


 


Lactic Acid    4.4 H*


 


Calcium   7.7 L 


 


Total Bilirubin   


 


Direct Bilirubin   


 


AST   


 


ALT   


 


Alkaline Phosphatase   


 


Total Protein   


 


Albumin   


 


Tumor Marker AFP   








Active Medications











Generic Name Dose Route Start Last Admin





  Trade Name Freq  PRN Reason Stop Dose Admin


 


Carvedilol  3.125 mg  11/15/19 22:00  11/18/19 09:01





  Coreg -  PO   3.125 mg





  BID KARLA   Administration





     





     





     





     


 


Furosemide  40 mg  11/15/19 16:00  11/18/19 09:01





  Lasix Injection -  IVPUSH   40 mg





  DAILY KARLA   Administration





     





     





     





     


 


Glycerin  1 each  11/18/19 15:18  





  Glycerin Suppository Adult -  [place]   





  DAILY PRN   





  CONSTIPATION   





     





     





     


 


Heparin Sodium (Porcine)  5,000 unit  11/15/19 06:00  11/18/19 13:21





  Heparin -  SQ   5,000 unit





  TID KARLA   Administration





     





     





     





     


 


Paroxetine HCl  30 mg  11/19/19 10:00  





  Paxil -  PO   





  DAILY KARLA   





     





     





     





     











ASSESSMENT/PLAN:


Ms. Toledo is an 87y/o female with HTN and HLD who presents with progressive 

shortness of breath and fatigue. Per daughter, the patient has become 

increasingly dyspneic on exertion over the last several months and especially 

in the last week. Pt was initially febrile and had leukocytosis but fever 

resolved and white count is borderline normal. Pt was found to have severe 

transaminitis and lactic acidosis. Echo showed LVH with EF 20-25% and RV 

pressure 40-50 with severe TR.





#acute HFrEF


Unknown if pt has previous hx of heart failure due to lack of follow up.


-Lasix 40mg IV


-life vest recommended at discharge





#transaminitis possibly 2/2 congestion vs cardiorenal syndrome vs viral


Improved from yesterday but has improved and worsened during hospital course.


-GI following- paroxetine may be causing transaminitis so recommend 

discontinuing


-will taper paroxetine given sudden stopping may cause withdrawal- decrease to 

30mg


-Hep A indeterminate- can repeat lab in a few weeks 


-ID following


-nephrology following





#hyponatremia


123.


-water restriction





#constipation


-glycerine suppository





#HTN


-carvedilol 3.125mg





DVT Ppx


heparin





FEN


fluid restriction


monitor Na


Na restricted diet








Visit type





- Emergency Visit


Emergency Visit: Yes


ED Registration Date: 11/14/19


Care time: The patient presented to the Emergency Department on the above date 

and was hospitalized for further evaluation of their emergent condition.





- New Patient


This patient is new to me today: Yes


Date on this admission: 11/18/19





- Critical Care


Critical Care patient: No





- Discharge Referral


Referred to [facility] No





ATTENDING PHYSICIAN STATEMENT





I saw and evaluated the patient.


I reviewed the resident's note and discussed the case with the resident.


I agree with the resident's findings and plan as documented.








SUBJECTIVE:








OBJECTIVE:








ASSESSMENT AND PLAN:

## 2019-11-18 NOTE — PN
Progress Note (short form)





- Note


Progress Note: 


s: no cp palps dizzy; sob better





 Vital Signs











 Period  Temp  Pulse  Resp  BP Sys/Reyes  Pulse Ox


 


 Last 24 Hr  97.4 F-98.9 F  89-93  17-18  108-129/59-79  100-100








nad no jvd


rrr s1s2 no mrg


cta bl nl ef


aao3


no le e/c/c


abd nt nd pos bs


no jaundice diaphoresis








 Current Medications











Generic Name Dose Route Start Last Admin





  Trade Name Freq  PRN Reason Stop Dose Admin


 


Carvedilol  3.125 mg  11/15/19 22:00  11/18/19 09:01





  Coreg -  PO   3.125 mg





  BID KARLA   Administration





     





     





     





     


 


Furosemide  40 mg  11/15/19 16:00  11/18/19 09:01





  Lasix Injection -  IVPUSH   40 mg





  DAILY KARLA   Administration





     





     





     





     


 


Heparin Sodium (Porcine)  5,000 unit  11/15/19 06:00  11/18/19 13:21





  Heparin -  SQ   5,000 unit





  TID KARLA   Administration





     





     





     





     


 


Paroxetine HCl  30 mg  11/19/19 10:00  





  Paxil -  PO   





  DAILY KARLA   





     





     





     





     








 CBC, BMP





 11/18/19 06:40 





 11/18/19 12:41 








ecg: sr lbbb





cxr: clear lungs





echo 11/2019:  lvef 20-25, global hk, nl rv, sev tr, mild lae, mod mr, rvsp 40-

50








a/p:  88 f hx hld, ckd (bl 1.2), here with sob.





sob, acute systolic chf:


-no known hx hrt dz but echo here showing reduced lvef and sev tr


-seems that pt has component of vol overload with hepatic congestion causing 

elevated lfts (also possible from hep A)


-has been getting lasix 40 iv qd with improvement in cr and sob. monitor daily 

wts, chemistry (renal consulted for hyponatremia)


-started coreg for chf regimen.  hold off on ace/arb until cr/lytes more stable.





hld:


-statin held 2/2 elevated lfts





yane on ckd:


-monitor cr with diuresis, possibly cardiorenal as cr improved with lasix








abnl ecg:


-lbbb likely due to sev dec lvef.  ischemic w/u when vol status improved.

## 2019-11-18 NOTE — PN.GI
GI Progress Note


Subjective: 





GI NOte: Has no GI complaints. LFTs have plateaued but not improved. The INR 

has slightly improved. Creatinine stable but BUN rising likely due to 

diuretics. Hepatitis A IgM indeterminant. Not clear whether the total hepatitis 

Ab is predominantly IgM or IgG. The latter would indicate immunity.   





- Objective


Vital Signs: 


 Vital Signs











Temperature  97.8 F   11/18/19 14:36


 


Pulse Rate  72   11/18/19 14:36


 


Respiratory Rate  20   11/18/19 14:36


 


Blood Pressure  107/63   11/18/19 14:36


 


O2 Sat by Pulse Oximetry (%)  100   11/18/19 09:00








 Laboratory Tests











  11/14/19 11/15/19 11/15/19





  14:39 07:00 07:00


 


PT with INR    20.30 H


 


Ferritin   


 


Total Bilirubin   


 


Direct Bilirubin   


 


AST   


 


ALT   


 


HARISH Screen   


 


Smooth Musc &RNP Intrp   


 


CMV IgM Ab   


 


Hep A IgM Ab Confirm   Indeterminate H 


 


Hepatitis A Ab Total   Positive H 


 


Hep Bs Antigen   Negative 


 


Hep Bs Antibody   Non reactive 


 


Hep B Core Total Ab   Negative 


 


Hep B Core IgM Ab   Negative 


 


Hepatitis Be Antibody   Negative 


 


Hepatitis Be Antigen   Negative 


 


Hepatitis C Ab (EIA)  0.1  














  11/15/19 11/15/19 11/15/19





  07:00 07:00 11:45


 


PT with INR   


 


Ferritin   


 


Total Bilirubin   


 


Direct Bilirubin   


 


AST   


 


ALT   


 


HARISH Screen  Negative  


 


Smooth Musc &RNP Intrp   10 


 


CMV IgM Ab    < 30.0


 


Hep A IgM Ab Confirm   


 


Hepatitis A Ab Total   


 


Hep Bs Antigen   


 


Hep Bs Antibody   


 


Hep B Core Total Ab   


 


Hep B Core IgM Ab   


 


Hepatitis Be Antibody   


 


Hepatitis Be Antigen   


 


Hepatitis C Ab (EIA)   














  11/16/19 11/16/19 11/17/19





  06:20 06:20 06:20


 


PT with INR  19.70 H  


 


Ferritin   60.5 


 


Total Bilirubin    2.0 H


 


Direct Bilirubin    1.2 H


 


AST    1659 H


 


ALT    1764 H


 


HARISH Screen   


 


Smooth Musc &RNP Intrp   


 


CMV IgM Ab   


 


Hep A IgM Ab Confirm   


 


Hepatitis A Ab Total   


 


Hep Bs Antigen   


 


Hep Bs Antibody   


 


Hep B Core Total Ab   


 


Hep B Core IgM Ab   


 


Hepatitis Be Antibody   


 


Hepatitis Be Antigen   


 


Hepatitis C Ab (EIA)   














  11/17/19 11/18/19 11/18/19





  06:20 06:40 06:40


 


PT with INR  23.70 H   22.20 H


 


Ferritin   


 


Total Bilirubin   2.0 H 


 


Direct Bilirubin   1.4 H 


 


AST   1523 H 


 


ALT   1666 H 


 


HARISH Screen   


 


Smooth Musc &RNP Intrp   


 


CMV IgM Ab   


 


Hep A IgM Ab Confirm   


 


Hepatitis A Ab Total   


 


Hep Bs Antigen   


 


Hep Bs Antibody   


 


Hep B Core Total Ab   


 


Hep B Core IgM Ab   


 


Hepatitis Be Antibody   


 


Hepatitis Be Antigen   


 


Hepatitis C Ab (EIA)   











Constitutional: Calm


...Auscultate: Yes: Normoactive Bowel Sounds


...Palpate: Yes: Soft, Other (nontender)


Labs: 


 CBC, BMP





 11/18/19 06:40 





 11/18/19 12:41 





 INR, PTT











INR  1.87  (0.83-1.09)  H  11/18/19  06:40    














Assessment/Plan





Assessment:


-- Suspect congestive hepatopathy 


-- Cannot exclude acute hepatitis A. Will contact lab to see how the IgG and 

IgM fractionate


-- Silent gallstones  





Plan:


-- Await MRCP


-- Repeat Hep A studies


-- Anticipate improvement with afterload reduction


-- Agree with need for renal consultant





    I discussed the case with Dr Pedro





Problem List





- Problems


(1) Hepatic congestion


Code(s): K76.1 - CHRONIC PASSIVE CONGESTION OF LIVER   





(2) Hepatitis A antibody positive


Code(s): R76.8 - OTHER SPECIFIED ABNORMAL IMMUNOLOGICAL FINDINGS IN SERUM   





(3) Abnormal liver enzymes


Code(s): R74.8 - ABNORMAL LEVELS OF OTHER SERUM ENZYMES   





(4) Hypertension


Code(s): I10 - ESSENTIAL (PRIMARY) HYPERTENSION   





(5) Hyperlipidemia


Code(s): E78.5 - HYPERLIPIDEMIA, UNSPECIFIED   





(6) New onset of congestive heart failure


Code(s): I50.9 - HEART FAILURE, UNSPECIFIED

## 2019-11-19 LAB
ALBUMIN SERPL-MCNC: 2.8 G/DL (ref 3.4–5)
ALP SERPL-CCNC: 106 U/L (ref 45–117)
ALT SERPL-CCNC: 1280 U/L (ref 13–61)
ANION GAP SERPL CALC-SCNC: 12 MMOL/L (ref 8–16)
AST SERPL-CCNC: 998 U/L (ref 15–37)
BILIRUB CONJ SERPL-MCNC: 1.2 MG/DL (ref 0–0.2)
BILIRUB SERPL-MCNC: 2 MG/DL (ref 0.2–1)
BUN SERPL-MCNC: 39.9 MG/DL (ref 7–18)
CALCIUM SERPL-MCNC: 8.3 MG/DL (ref 8.5–10.1)
CHLORIDE SERPL-SCNC: 88 MMOL/L (ref 98–107)
CO2 SERPL-SCNC: 25 MMOL/L (ref 21–32)
CREAT SERPL-MCNC: 1.2 MG/DL (ref 0.55–1.3)
DEPRECATED RDW RBC AUTO: 15 % (ref 11.6–15.6)
GLUCOSE SERPL-MCNC: 222 MG/DL (ref 74–106)
HCT VFR BLD CALC: 38.1 % (ref 32.4–45.2)
HGB BLD-MCNC: 12.1 GM/DL (ref 10.7–15.3)
INR BLD: 1.64 (ref 0.83–1.09)
MCH RBC QN AUTO: 26 PG (ref 25.7–33.7)
MCHC RBC AUTO-ENTMCNC: 31.7 G/DL (ref 32–36)
MCV RBC: 82.3 FL (ref 80–96)
OSMOLALITY SERPL: 284 MOSM/KG (ref 278–305)
PLATELET # BLD AUTO: 311 K/MM3 (ref 134–434)
PMV BLD: 8.8 FL (ref 7.5–11.1)
POTASSIUM SERPLBLD-SCNC: 4.3 MMOL/L (ref 3.5–5.1)
PROT SERPL-MCNC: 5.7 G/DL (ref 6.4–8.2)
PT PNL PPP: 19.4 SEC (ref 9.7–13)
RBC # BLD AUTO: 4.63 M/MM3 (ref 3.6–5.2)
SODIUM SERPL-SCNC: 125 MMOL/L (ref 136–145)
WBC # BLD AUTO: 14.2 K/MM3 (ref 4–10)

## 2019-11-19 RX ADMIN — CARVEDILOL SCH MG: 3.12 TABLET, FILM COATED ORAL at 21:51

## 2019-11-19 RX ADMIN — POLYETHYLENE GLYCOL 3350 SCH GRAMS: 17 POWDER, FOR SOLUTION ORAL at 21:52

## 2019-11-19 RX ADMIN — CARVEDILOL SCH MG: 3.12 TABLET, FILM COATED ORAL at 09:18

## 2019-11-19 RX ADMIN — FUROSEMIDE SCH MG: 10 INJECTION, SOLUTION INTRAVENOUS at 09:18

## 2019-11-19 RX ADMIN — HEPARIN SODIUM SCH UNIT: 5000 INJECTION, SOLUTION INTRAVENOUS; SUBCUTANEOUS at 21:51

## 2019-11-19 RX ADMIN — HEPARIN SODIUM SCH UNIT: 5000 INJECTION, SOLUTION INTRAVENOUS; SUBCUTANEOUS at 14:05

## 2019-11-19 RX ADMIN — PANTOPRAZOLE SODIUM SCH MG: 40 TABLET, DELAYED RELEASE ORAL at 09:18

## 2019-11-19 RX ADMIN — HEPARIN SODIUM SCH UNIT: 5000 INJECTION, SOLUTION INTRAVENOUS; SUBCUTANEOUS at 05:35

## 2019-11-19 NOTE — PN
Progress Note (short form)





- Note


Progress Note: 


s: no chest pain, palps, dizziness, dyspnea





  Vital Signs











 Period  Temp  Pulse  Resp  BP Sys/Reyes  Pulse Ox


 


 Last 24 Hr  97.8 F-98.2 F  70-93  20-20  107-130/63-82  











nad no jvd


rrr s1s2 no mrg


cta bl nl ef


aao3


no le e/c/c


abd nt nd pos bs


no jaundice diaphoresis





 Current Medications





Carvedilol (Coreg -)  3.125 mg PO BID [facility]


   Last Admin: 11/19/19 09:18 Dose:  3.125 mg


Furosemide (Lasix Injection -)  40 mg IVPUSH DAILY [facility]


   Last Admin: 11/19/19 09:18 Dose:  40 mg


Glycerin (Glycerin Suppository Adult -)  1 each CO DAILY PRN


   PRN Reason: CONSTIPATION


   Last Admin: 11/18/19 18:08 Dose:  1 each


Heparin Sodium (Porcine) (Heparin -)  5,000 unit SQ TID [facility]


   Last Admin: 11/19/19 05:35 Dose:  5,000 unit


Pantoprazole Sodium (Protonix -)  40 mg PO DAILY [facility]


   Last Admin: 11/19/19 09:18 Dose:  40 mg


Paroxetine HCl (Paxil -)  30 mg PO DAILY [facility]


   Last Admin: 11/19/19 09:18 Dose:  30 mg








ecg: sr lbbb





cxr: clear lungs





echo 11/2019:  lvef 20-25, global hk, nl rv, sev tr, mild lae, mod mr, rvsp 40-

50








a/p:  88 f hx hld, ckd (bl 1.2), here with sob.





sob, acute systolic chf:


-no known hx hrt dz but echo here showing reduced lvef and sev tr


-seems that pt has component of vol overload with hepatic congestion causing 

elevated lfts (also possible from hep A)


-has been getting lasix 40 iv qd with improvement in cr and sob, LFTs coming 

down as well. monitor daily wts, chemistry (renal consulted for hyponatremia), 

cont current lasix


-started coreg for chf regimen.  hold off on ace/arb until cr/lytes more stable.





hld:


-statin held 2/2 elevated lfts





yane on ckd:


-monitor cr with diuresis, possibly cardiorenal as cr improved with lasix








abnl ecg:


-lbbb likely due to sev dec lvef.  ischemic w/u when vol status improved.

## 2019-11-19 NOTE — PN.GI
GI Progress Note


Subjective: 





GI NOte: Pamela is feeling bloated and her daughter tells me that she has not 

moved her bowels in several days. Her LFTs and INR have begun to improve. I 

discussed the hepatitis A studies with the lab who contacted [facility] that 

informed us that her hepatitis A total Ab is all IgG indicative of immunity. I 

informed Pamela and her daughter.   





- Objective


Vital Signs: 


 Vital Signs











Temperature  97.5 F L  11/19/19 15:03


 


Pulse Rate  77   11/19/19 15:03


 


Respiratory Rate  20   11/19/19 15:03


 


Blood Pressure  137/73   11/19/19 15:03


 


O2 Sat by Pulse Oximetry (%)  99   11/19/19 09:00








 Laboratory Tests











  11/14/19 11/14/19 11/15/19





  12:50 14:20 07:00


 


PT with INR   


 


INR   


 


Ferritin   


 


Total Bilirubin   1.0 


 


AST  3595 H   2755 H


 


ALT    2175 H


 


Ammonia   


 


Tumor Marker AFP   














  11/16/19 11/16/19 11/16/19





  06:20 06:20 06:20


 


PT with INR   


 


INR   


 


Ferritin    60.5


 


Total Bilirubin    1.2 H


 


AST  1759 H  


 


ALT   


 


Ammonia   


 


Tumor Marker AFP   2.9 














  11/17/19 11/18/19 11/18/19





  06:20 06:40 06:40


 


PT with INR    22.20 H


 


INR    1.87 H


 


Ferritin   


 


Total Bilirubin  2.0 H  


 


AST   1523 H 


 


ALT  1764 H  1666 H 


 


Ammonia   


 


Tumor Marker AFP   














  11/19/19 11/19/19 11/19/19





  06:15 06:15 06:15


 


PT with INR   19.40 H 


 


INR   1.64 H 


 


Ferritin   


 


Total Bilirubin  2.0 H  


 


AST  998 H  


 


ALT  1280 H  


 


Ammonia    13.20


 


Tumor Marker AFP   











Constitutional: No Distress


Gastrointestinal Inspection: Yes: Distention


...Auscultate: Yes: Hypoactive Bowel Sounds


...Palpate: Yes: Soft, Other (nontender)


...Percussion: Yes: Tympanitic


Labs: 


 CBC, BMP





 11/19/19 06:15 





 11/19/19 06:15 





 INR, PTT











INR  1.64  (0.83-1.09)  H  11/19/19  06:15    














Assessment/Plan





Assessment:


-- Suspect congestive hepatopathy which is responding to afterload reduction


-- Acute hepatitis A excluded by presence of IgG


-- Silent gallstones  


-- Constipatiion due to hospital sedentary state





Plan:


-- Start Miralax


-- Anticipate stedy improvement with afterload reduction





    I discussed the case with Dr Pedro and Dr Valdez





Problem List





- Problems


(1) Hepatic congestion


Code(s): K76.1 - CHRONIC PASSIVE CONGESTION OF LIVER   





(2) Hepatitis A antibody positive


Code(s): R76.8 - OTHER SPECIFIED ABNORMAL IMMUNOLOGICAL FINDINGS IN SERUM   





(3) Abnormal liver enzymes


Code(s): R74.8 - ABNORMAL LEVELS OF OTHER SERUM ENZYMES   





(4) Hypertension


Code(s): I10 - ESSENTIAL (PRIMARY) HYPERTENSION   





(5) Hyperlipidemia


Code(s): E78.5 - HYPERLIPIDEMIA, UNSPECIFIED   





(6) New onset of congestive heart failure


Code(s): I50.9 - HEART FAILURE, UNSPECIFIED   





(7) Constipation


Code(s): K59.00 - CONSTIPATION, UNSPECIFIED

## 2019-11-19 NOTE — PN
Physical Exam: 


SUBJECTIVE: Patient seen and examined. She reports shortness of breath is about 

the same as yesterday. She denies chest pain, abdominal pain, n/v/d.








OBJECTIVE:





 Vital Signs











 Period  Temp  Pulse  Resp  BP Sys/Reyes  Pulse Ox


 


 Last 24 Hr  97.8 F-98.2 F  70-93  20-20  107-130/63-82  











GENERAL: The patient is awake, alert, and fully oriented, in no acute distress.


HEAD: Normal with no signs of trauma.


EYES: PERRL, extraocular movements intact, conjunctiva clear.


ENT: Ears normal, nares patent, moist mucous membranes.


NECK: Trachea midline, full range of motion, supple. 


LUNGS: Bilateral basilar crackles


HEART: Regular rate and rhythm, no murmur appreciated


ABDOMEN: Soft, nontender, nondistended, normoactive bowel sounds, no guarding


EXTREMITIES: Warm, well-perfused, no edema. 


NEUROLOGICAL: Cranial nerves II through XII grossly intact. Normal speech


PSYCH: Normal mood, normal affect.


SKIN: Warm, dry, normal turgor





martinez 2100cc yesterday





 Laboratory Results - last 24 hr











  11/16/19 11/18/19 11/18/19





  06:20 12:41 14:40


 


WBC   


 


RBC   


 


Hgb   


 


Hct   


 


MCV   


 


MCH   


 


MCHC   


 


RDW   


 


Plt Count   


 


MPV   


 


PT with INR   


 


INR   


 


Sodium   124 L 


 


Potassium   4.0 


 


Chloride   87 L 


 


Carbon Dioxide   24 


 


Anion Gap   12 


 


BUN   38.0 H 


 


Creatinine   1.2 


 


Est GFR (CKD-EPI)AfAm   46.73 


 


Est GFR (CKD-EPI)NonAf   40.32 


 


Random Glucose   302 H 


 


Serum Osmolality   


 


Lactic Acid    4.4 H*


 


Calcium   7.7 L 


 


Total Bilirubin   


 


Direct Bilirubin   


 


AST   


 


ALT   


 


Alkaline Phosphatase   


 


Ammonia   


 


LD Total  881 H  


 


Total Protein   


 


Albumin   


 


LDL 1 Fraction  7.0 L  


 


LDL 2 Fraction  13.0 L  


 


LDL 3 Fraction  11.0 L  


 


LDL 4 Fraction  8.0  


 


LDL 5 Fraction  61.0 H  


 


TSH   


 


Urine Osmolality   


 


Ur Random Sodium   


 


Ur Random Potassium   


 


Ur Random Chloride   


 


Hep A IgM Ab Confirm   


 


Hepatitis A Ab Total   














  11/18/19 11/18/19 11/19/19





  16:00 16:00 06:15


 


WBC   


 


RBC   


 


Hgb   


 


Hct   


 


MCV   


 


MCH   


 


MCHC   


 


RDW   


 


Plt Count   


 


MPV   


 


PT with INR   


 


INR   


 


Sodium    125 L


 


Potassium    4.3


 


Chloride    88 L


 


Carbon Dioxide    25


 


Anion Gap    12


 


BUN    39.9 H


 


Creatinine    1.2


 


Est GFR (CKD-EPI)AfAm    46.73


 


Est GFR (CKD-EPI)NonAf    40.32


 


Random Glucose    222 H


 


Serum Osmolality   


 


Lactic Acid   


 


Calcium    8.3 L


 


Total Bilirubin    2.0 H


 


Direct Bilirubin    1.2 H


 


AST    998 H


 


ALT    1280 H


 


Alkaline Phosphatase    106


 


Ammonia   


 


LD Total   


 


Total Protein    5.7 L


 


Albumin    2.8 L


 


LDL 1 Fraction   


 


LDL 2 Fraction   


 


LDL 3 Fraction   


 


LDL 4 Fraction   


 


LDL 5 Fraction   


 


TSH   


 


Urine Osmolality   391 


 


Ur Random Sodium  11 L  


 


Ur Random Potassium  16.0 L  


 


Ur Random Chloride  < 11 L  


 


Hep A IgM Ab Confirm   


 


Hepatitis A Ab Total   














  11/19/19 11/19/19 11/19/19





  06:15 06:15 06:15


 


WBC   14.2 H 


 


RBC   4.63 


 


Hgb   12.1 


 


Hct   38.1 


 


MCV   82.3 


 


MCH   26.0 


 


MCHC   31.7 L 


 


RDW   15.0 


 


Plt Count   311 


 


MPV   8.8 


 


PT with INR  19.40 H  


 


INR  1.64 H  


 


Sodium   


 


Potassium   


 


Chloride   


 


Carbon Dioxide   


 


Anion Gap   


 


BUN   


 


Creatinine   


 


Est GFR (CKD-EPI)AfAm   


 


Est GFR (CKD-EPI)NonAf   


 


Random Glucose   


 


Serum Osmolality    284


 


Lactic Acid   


 


Calcium   


 


Total Bilirubin   


 


Direct Bilirubin   


 


AST   


 


ALT   


 


Alkaline Phosphatase   


 


Ammonia   


 


LD Total   


 


Total Protein   


 


Albumin   


 


LDL 1 Fraction   


 


LDL 2 Fraction   


 


LDL 3 Fraction   


 


LDL 4 Fraction   


 


LDL 5 Fraction   


 


TSH    1.86


 


Urine Osmolality   


 


Ur Random Sodium   


 


Ur Random Potassium   


 


Ur Random Chloride   


 


Hep A IgM Ab Confirm   


 


Hepatitis A Ab Total   














  11/19/19 11/19/19





  06:15 06:15


 


WBC  


 


RBC  


 


Hgb  


 


Hct  


 


MCV  


 


MCH  


 


MCHC  


 


RDW  


 


Plt Count  


 


MPV  


 


PT with INR  


 


INR  


 


Sodium  


 


Potassium  


 


Chloride  


 


Carbon Dioxide  


 


Anion Gap  


 


BUN  


 


Creatinine  


 


Est GFR (CKD-EPI)AfAm  


 


Est GFR (CKD-EPI)NonAf  


 


Random Glucose  


 


Serum Osmolality  


 


Lactic Acid  


 


Calcium  


 


Total Bilirubin  


 


Direct Bilirubin  


 


AST  


 


ALT  


 


Alkaline Phosphatase  


 


Ammonia   13.20


 


LD Total  


 


Total Protein  


 


Albumin  


 


LDL 1 Fraction  


 


LDL 2 Fraction  


 


LDL 3 Fraction  


 


LDL 4 Fraction  


 


LDL 5 Fraction  


 


TSH  


 


Urine Osmolality  


 


Ur Random Sodium  


 


Ur Random Potassium  


 


Ur Random Chloride  


 


Hep A IgM Ab Confirm  Cancelled 


 


Hepatitis A Ab Total  Cancelled 








Active Medications











Generic Name Dose Route Start Last Admin





  Trade Name Freq  PRN Reason Stop Dose Admin


 


Carvedilol  3.125 mg  11/15/19 22:00  11/19/19 09:18





  Coreg -  PO   3.125 mg





  BID KARLA   Administration





     





     





     





     


 


Furosemide  40 mg  11/15/19 16:00  11/19/19 09:18





  Lasix Injection -  IVPUSH   40 mg





  DAILY KARLA   Administration





     





     





     





     


 


Glycerin  1 each  11/18/19 15:18  11/18/19 18:08





  Glycerin Suppository Adult -  MT   1 each





  DAILY PRN   Administration





  CONSTIPATION   





     





     





     


 


Heparin Sodium (Porcine)  5,000 unit  11/15/19 06:00  11/19/19 14:05





  Heparin -  SQ   5,000 unit





  TID KARLA   Administration





     





     





     





     


 


Pantoprazole Sodium  40 mg  11/19/19 10:00  11/19/19 09:18





  Protonix -  PO   40 mg





  DAILY KARLA   Administration





     





     





     





     


 


Paroxetine HCl  30 mg  11/19/19 10:00  11/19/19 09:18





  Paxil -  PO   30 mg





  DAILY KARLA   Administration





     





     





     





     











ASSESSMENT/PLAN:


Ms. Toledo is an 89y/o female with HTN and HLD who presents with progressive 

shortness of breath and fatigue. Per daughter, the patient has become 

increasingly dyspneic on exertion over the last several months and especially 

in the last week. Pt was initially febrile and had leukocytosis but fever 

resolved and white count is borderline normal. Pt was found to have severe 

transaminitis and lactic acidosis. Echo showed LVH with EF 20-25% and RV 

pressure 40-50 with severe TR.





#acute HFrEF


Unknown if pt has previous hx of heart failure due to lack of follow up.


-Lasix 40mg IV


-in pt stress test after fluid improvement





#transaminitis possibly 2/2 congestion vs cardiorenal syndrome


Improved from yesterday but has improved and worsened during hospital course.


-GI following- paroxetine may be causing transaminitis so recommend 

discontinuing


-will taper paroxetine given sudden stopping may cause withdrawal- decrease to 

30mg


-acute hep A negative (IgG present)


-ID following


-nephrology following


-MRI





#hyponatremia likely 2/2 HF


123.


-water restriction





#constipation


-glycerine suppository





#HTN


-carvedilol 3.125mg





DVT Ppx


heparin





FEN


fluid restriction


monitor Na


Na restricted diet











Visit type





- Emergency Visit


Emergency Visit: Yes


ED Registration Date: 11/14/19


Care time: The patient presented to the Emergency Department on the above date 

and was hospitalized for further evaluation of their emergent condition.





- New Patient


This patient is new to me today: No





- Critical Care


Critical Care patient: No





- Discharge Referral


Referred to [facility] No





ATTENDING PHYSICIAN STATEMENT





I saw and evaluated the patient.


I reviewed the resident's note and discussed the case with the resident.


I agree with the resident's findings and plan as documented.








SUBJECTIVE:








OBJECTIVE:








ASSESSMENT AND PLAN:

## 2019-11-19 NOTE — PN
Progress Note, Physician


History of Present Illness: 





Pt seen and examined at bedside. She is awake and alert. She denies shortness 

of breath. 





- Current Medication List


Current Medications: 


Active Medications





Carvedilol (Coreg -)  3.125 mg PO BID [facility]


   Last Admin: 11/19/19 09:18 Dose:  3.125 mg


Furosemide (Lasix Injection -)  40 mg IVPUSH DAILY [facility]


   Last Admin: 11/19/19 09:18 Dose:  40 mg


Glycerin (Glycerin Suppository Adult -)  1 each AR DAILY PRN


   PRN Reason: CONSTIPATION


   Last Admin: 11/18/19 18:08 Dose:  1 each


Heparin Sodium (Porcine) (Heparin -)  5,000 unit SQ TID [facility]


   Last Admin: 11/19/19 14:05 Dose:  5,000 unit


Pantoprazole Sodium (Protonix -)  40 mg PO DAILY [facility]


   Last Admin: 11/19/19 09:18 Dose:  40 mg


Paroxetine HCl (Paxil -)  30 mg PO DAILY [facility]


   Last Admin: 11/19/19 09:18 Dose:  30 mg











- Objective


Vital Signs: 


 Vital Signs











Temperature  97.5 F L  11/19/19 15:03


 


Pulse Rate  77   11/19/19 15:03


 


Respiratory Rate  20   11/19/19 15:03


 


Blood Pressure  137/73   11/19/19 15:03


 


O2 Sat by Pulse Oximetry (%)  99   11/19/19 09:00











Constitutional: Yes: Calm


Eyes: Yes: Conjunctiva Clear


HENT: Yes: Atraumatic


Neck: Yes: Supple


Cardiovascular: Yes: S1, S2


Respiratory: Yes: CTA Bilaterally


Gastrointestinal: Yes: Soft


Genitourinary: Yes: WNL


Musculoskeletal: Yes: WNL


Edema: Yes


Edema: LLE: Trace, RLE: Trace


Neurological: Yes: Oriented


Psychiatric: Yes: Oriented


Labs: 


 CBC, BMP





 11/19/19 06:15 





 11/19/19 06:15 





 INR, PTT











INR  1.64  (0.83-1.09)  H  11/19/19  06:15    














Problem List





- Problems


(1) Hyponatremia


Code(s): E87.1 - HYPO-OSMOLALITY AND HYPONATREMIA   





(2) Abnormal liver enzymes


Code(s): R74.8 - ABNORMAL LEVELS OF OTHER SERUM ENZYMES   





(3) New onset of congestive heart failure


Code(s): I50.9 - HEART FAILURE, UNSPECIFIED   





Assessment/Plan


 Current Medications











Generic Name Dose Route Start Last Admin





  Trade Name Freq  PRN Reason Stop Dose Admin


 


Carvedilol  3.125 mg  11/15/19 22:00  11/19/19 09:18





  Coreg -  PO   3.125 mg





  BID KARLA   Administration





     





     





     





     


 


Furosemide  40 mg  11/15/19 16:00  11/19/19 09:18





  Lasix Injection -  IVPUSH   40 mg





  DAILY KARLA   Administration





     





     





     





     


 


Glycerin  1 each  11/18/19 15:18  11/18/19 18:08





  Glycerin Suppository Adult -  AR   1 each





  DAILY PRN   Administration





  CONSTIPATION   





     





     





     


 


Heparin Sodium (Porcine)  5,000 unit  11/15/19 06:00  11/19/19 14:05





  Heparin -  SQ   5,000 unit





  TID KARLA   Administration





     





     





     





     


 


Pantoprazole Sodium  40 mg  11/19/19 10:00  11/19/19 09:18





  Protonix -  PO   40 mg





  DAILY KARLA   Administration





     





     





     





     


 


Paroxetine HCl  30 mg  11/19/19 10:00  11/19/19 09:18





  Paxil -  PO   30 mg





  DAILY KARLA   Administration





     





     





     





     





 Laboratory Tests











  11/18/19 11/19/19 11/19/19





  16:00 06:15 06:15


 


TSH   1.86 


 


Cortisol AM Sample    Pending


 


Urine Osmolality  391  





 Laboratory Tests











  11/18/19





  16:00


 


Ur Random Sodium  11 L

















Impression


1. hyponatremia


2. chf


3. transaminitis


4. hld





Plan


- cont lasix


- restrict free water


- discussed with family


- sodium improving


- etiology of hyponatremia likely chf 


- will follow

## 2019-11-19 NOTE — PN
Teaching Attending Note


Name of Resident: Hetal Sommers





ATTENDING PHYSICIAN STATEMENT





I saw and evaluated the patient.


I reviewed the resident's note and discussed the case with the resident.


I agree with the resident's findings and plan as documented.








SUBJECTIVE:





No pain or SOB . no CP .





OBJECTIVE:


NAD, awake, alert, cooperative 


HEENT: MMM + JVD. 


CV: RRR, no MRG 


Lungs: b/l basilar cracklesbut much improved compared to yesterday


Abd: soft, NT, ND, NL BS 


Ext: trace edema 


 





ASSESSMENT AND PLAN:





87 y/o lady with h/o HTN, HLP, Hysterectomy who presented with fever , chills x 

1 -2 weeks and was found to have transaminitis . 





1- New onset acute systolic heart failure: with severely reduced EF. 


- cause is unknown. ? viral cardiomyositis. need to r/o ischemic 

cardiomyopathy. 


- cont lasix 


- cont coreg. 


- ?  life vest before dc given her EF is 20-25%


- will need R/L heart cath at some point 


- hold ACE/ARB for now. 


- RVP pending 





2- Acute transaminitis: still unclear etiology? congestive hepatopathy, VS 

viral ( hep A) Vs other


stable LFTS 


- cont supportive measures 


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 d/w ID    


- cont to hold statin and fibrates 


- cont Paroxitine at a lower dose 30. 








3- MASSIEL: probably prerenal azotemia ( cardio-renal sx ) . 


- cont diuresis 


- cont to hold losartan . if renal function remains stable, then might start a  

low dose 





4- SIRS, no source of bacterial infection. 


 


5- H/o HTN: hold losratan . cont coreg .monitor BP 





6- DVT PX : heparin SQ

## 2019-11-20 LAB
ALBUMIN SERPL-MCNC: 2.9 G/DL (ref 3.4–5)
ALP SERPL-CCNC: 117 U/L (ref 45–117)
ALT SERPL-CCNC: 1080 U/L (ref 13–61)
ANION GAP SERPL CALC-SCNC: 7 MMOL/L (ref 8–16)
AST SERPL-CCNC: 648 U/L (ref 15–37)
BILIRUB CONJ SERPL-MCNC: 1 MG/DL (ref 0–0.2)
BILIRUB SERPL-MCNC: 1.7 MG/DL (ref 0.2–1)
BUN SERPL-MCNC: 41.4 MG/DL (ref 7–18)
CALCIUM SERPL-MCNC: 8.5 MG/DL (ref 8.5–10.1)
CHLORIDE SERPL-SCNC: 88 MMOL/L (ref 98–107)
CO2 SERPL-SCNC: 28 MMOL/L (ref 21–32)
CREAT SERPL-MCNC: 1.2 MG/DL (ref 0.55–1.3)
DEPRECATED RDW RBC AUTO: 14.9 % (ref 11.6–15.6)
GLUCOSE SERPL-MCNC: 283 MG/DL (ref 74–106)
HCT VFR BLD CALC: 39.5 % (ref 32.4–45.2)
HGB BLD-MCNC: 12.5 GM/DL (ref 10.7–15.3)
INR BLD: 1.46 (ref 0.83–1.09)
MCH RBC QN AUTO: 26.1 PG (ref 25.7–33.7)
MCHC RBC AUTO-ENTMCNC: 31.8 G/DL (ref 32–36)
MCV RBC: 82.1 FL (ref 80–96)
PLATELET # BLD AUTO: 318 K/MM3 (ref 134–434)
PMV BLD: 8.6 FL (ref 7.5–11.1)
POTASSIUM SERPLBLD-SCNC: 4.8 MMOL/L (ref 3.5–5.1)
PROT SERPL-MCNC: 6 G/DL (ref 6.4–8.2)
PT PNL PPP: 17.3 SEC (ref 9.7–13)
RBC # BLD AUTO: 4.81 M/MM3 (ref 3.6–5.2)
SODIUM SERPL-SCNC: 124 MMOL/L (ref 136–145)
WBC # BLD AUTO: 12.9 K/MM3 (ref 4–10)

## 2019-11-20 RX ADMIN — POLYETHYLENE GLYCOL 3350 SCH GRAMS: 17 POWDER, FOR SOLUTION ORAL at 09:54

## 2019-11-20 RX ADMIN — FUROSEMIDE SCH MG: 10 INJECTION, SOLUTION INTRAVENOUS at 09:51

## 2019-11-20 RX ADMIN — CARVEDILOL SCH MG: 3.12 TABLET, FILM COATED ORAL at 09:54

## 2019-11-20 RX ADMIN — HEPARIN SODIUM SCH UNIT: 5000 INJECTION, SOLUTION INTRAVENOUS; SUBCUTANEOUS at 21:08

## 2019-11-20 RX ADMIN — HEPARIN SODIUM SCH UNIT: 5000 INJECTION, SOLUTION INTRAVENOUS; SUBCUTANEOUS at 16:12

## 2019-11-20 RX ADMIN — PANTOPRAZOLE SODIUM SCH MG: 40 TABLET, DELAYED RELEASE ORAL at 09:51

## 2019-11-20 RX ADMIN — CARVEDILOL SCH MG: 3.12 TABLET, FILM COATED ORAL at 21:08

## 2019-11-20 RX ADMIN — HEPARIN SODIUM SCH UNIT: 5000 INJECTION, SOLUTION INTRAVENOUS; SUBCUTANEOUS at 06:04

## 2019-11-20 RX ADMIN — POLYETHYLENE GLYCOL 3350 SCH GRAMS: 17 POWDER, FOR SOLUTION ORAL at 21:08

## 2019-11-20 NOTE — PN
Physical Exam: 


SUBJECTIVE: Patient seen and examined. Pt reports shortness of breath is the 

same as yesterday, but she feels comfortable. No other complaints at this time.








OBJECTIVE:





 Vital Signs











 Period  Temp  Pulse  Resp  BP Sys/Reyes  Pulse Ox


 


 Last 24 Hr  97.5 F-97.9 F  71-88  16-20  112-137/60-73  99











GENERAL: The patient is awake, alert, and fully oriented, in no acute distress.


HEAD: Normal with no signs of trauma.


EYES: PERRL, extraocular movements intact, conjunctiva clear.


ENT: Ears normal, nares patent, moist mucous membranes.


NECK: Trachea midline, full range of motion, supple. 


LUNGS: Bilateral basilar crackles L>R


HEART: Regular rate and rhythm, no murmur appreciated


ABDOMEN: Soft, nontender, nondistended, normoactive bowel sounds, no guarding


EXTREMITIES: Warm, well-perfused, no edema. 


NEUROLOGICAL: Cranial nerves II through XII grossly intact. Normal speech


PSYCH: Normal mood, normal affect.


SKIN: Warm, dry, normal turgor








urine martinez 700cc





 Laboratory Results - last 24 hr











  11/20/19 11/20/19 11/20/19





  06:10 06:10 06:10


 


WBC  12.9 H  


 


RBC  4.81  


 


Hgb  12.5  


 


Hct  39.5  


 


MCV  82.1  


 


MCH  26.1  


 


MCHC  31.8 L  


 


RDW  14.9  


 


Plt Count  318  


 


MPV  8.6  


 


PT with INR   17.30 H 


 


INR   1.46 H 


 


Sodium    124 L


 


Potassium    4.8


 


Chloride    88 L


 


Carbon Dioxide    28


 


Anion Gap    7 L


 


BUN    41.4 H


 


Creatinine    1.2


 


Est GFR (CKD-EPI)AfAm    46.73


 


Est GFR (CKD-EPI)NonAf    40.32


 


Random Glucose    283 H


 


Calcium    8.5


 


Total Bilirubin    1.7 H


 


Direct Bilirubin    1.0 H


 


AST    648 H


 


ALT    1080 H


 


Alkaline Phosphatase    117


 


Total Protein    6.0 L


 


Albumin    2.9 L








Active Medications











Generic Name Dose Route Start Last Admin





  Trade Name Freq  PRN Reason Stop Dose Admin


 


Carvedilol  3.125 mg  11/15/19 22:00  11/20/19 09:54





  Coreg -  PO   3.125 mg





  BID KARLA   Administration





     





     





     





     


 


Furosemide  40 mg  11/15/19 16:00  11/20/19 09:51





  Lasix Injection -  IVPUSH   40 mg





  DAILY KARLA   Administration





     





     





     





     


 


Glycerin  1 each  11/18/19 15:18  11/18/19 18:08





  Glycerin Suppository Adult -  NC   1 each





  DAILY PRN   Administration





  CONSTIPATION   





     





     





     


 


Heparin Sodium (Porcine)  5,000 unit  11/15/19 06:00  11/20/19 06:04





  Heparin -  SQ   5,000 unit





  TID KARLA   Administration





     





     





     





     


 


Pantoprazole Sodium  40 mg  11/19/19 10:00  11/20/19 09:51





  Protonix -  PO   40 mg





  DAILY KARLA   Administration





     





     





     





     


 


Paroxetine HCl  30 mg  11/19/19 10:00  11/20/19 09:52





  Paxil -  PO   30 mg





  DAILY KARLA   Administration





     





     





     





     


 


Polyethylene Glycol  17 gm  11/19/19 22:00  11/20/19 09:54





  Miralax (For Daily Use) -  PO   17 grams





  BID KARLA   Administration





     





     





     





     











ASSESSMENT/PLAN:


Ms. Toledo is an 89y/o female with HTN and HLD who presents with progressive 

shortness of breath and fatigue. Per daughter, the patient has become 

increasingly dyspneic on exertion over the last several months and especially 

in the last week. Pt was initially febrile and had leukocytosis but fever 

resolved and white count is borderline normal. Pt was found to have severe 

transaminitis and lactic acidosis. Echo showed LVH with EF 20-25% and RV 

pressure 40-50 with severe TR.





#acute HFrEF


Unknown if pt has previous hx of heart failure due to lack of follow up.


-Lasix 40mg IV


-in pt stress test after fluid improvement


-carvedilol 3.125mg BID





#transaminitis possibly 2/2 congestion vs cardiorenal syndrome


continued improvement


-GI following- paroxetine tapering


-acute hep A negative (IgG present)


-ID following


-nephrology following


-MRI- no abnormalities of liver, gallstones present in gallbladder but no 

infection


-monitor INR- downtrending as well





#hyponatremia likely 2/2 HF


124.


-water restriction





#constipation


-miralax





#HTN


-carvedilol





DVT Ppx


heparin





FEN


fluid restriction


monitor Na


Na restricted diet








Visit type





- Emergency Visit


Emergency Visit: Yes


ED Registration Date: 11/14/19


Care time: The patient presented to the Emergency Department on the above date 

and was hospitalized for further evaluation of their emergent condition.





- New Patient


This patient is new to me today: No





- Critical Care


Critical Care patient: No





- Discharge Referral


Referred to [facility] No





ATTENDING PHYSICIAN STATEMENT





I saw and evaluated the patient.


I reviewed the resident's note and discussed the case with the resident.


I agree with the resident's findings and plan as documented.








SUBJECTIVE:








OBJECTIVE:








ASSESSMENT AND PLAN:

## 2019-11-20 NOTE — PN
Progress Note (short form)





- Note


Progress Note: 


s: no chest pain, palps, dizziness, dyspnea





  


 Vital Signs











 Period  Temp  Pulse  Resp  BP Sys/Reyes  Pulse Ox


 


 Last 24 Hr  97.5 F-97.9 F  71-88  16-20  112-137/60-73  99











nad no jvd


rrr s1s2 no mrg


cta bl nl ef


aao3


no le e/c/c


abd nt nd pos bs


no jaundice diaphoresis





  Current Medications





Carvedilol (Coreg -)  3.125 mg PO BID [facility]


   Last Admin: 11/20/19 09:54 Dose:  3.125 mg


Furosemide (Lasix Injection -)  40 mg IVPUSH DAILY [facility]


   Last Admin: 11/20/19 09:51 Dose:  40 mg


Glycerin (Glycerin Suppository Adult -)  1 each NM DAILY PRN


   PRN Reason: CONSTIPATION


   Last Admin: 11/18/19 18:08 Dose:  1 each


Heparin Sodium (Porcine) (Heparin -)  5,000 unit SQ TID [facility]


   Last Admin: 11/20/19 06:04 Dose:  5,000 unit


Pantoprazole Sodium (Protonix -)  40 mg PO DAILY [facility]


   Last Admin: 11/20/19 09:51 Dose:  40 mg


Paroxetine HCl (Paxil -)  30 mg PO DAILY [facility]


   Last Admin: 11/20/19 09:52 Dose:  30 mg


Polyethylene Glycol (Miralax (For Daily Use) -)  17 gm PO BID [facility]


   Last Admin: 11/20/19 09:54 Dose:  17 grams








ecg: sr lbbb





cxr: clear lungs





echo 11/2019:  lvef 20-25, global hk, nl rv, sev tr, mild lae, mod mr, rvsp 40-

50








a/p:  88 f hx hld, ckd (bl 1.2), here with sob.





sob, acute systolic chf:


-no known hx hrt dz but echo here showing reduced lvef and sev tr


-seems that pt has component of vol overload with hepatic congestion causing 

elevated lfts (also possible from hep A)


-has been getting lasix 40 iv qd with improvement in cr and sob, LFTs coming 

down as well


- monitor daily wts, lytes


- cont current lasix


-started coreg for chf regimen.  hold off on ace/arb until cr/lytes more stable.





hld:


-statin held 2/2 elevated lfts





yane on ckd:


-monitor cr with diuresis, possibly cardiorenal as cr improved with lasix








abnl ecg:


-lbbb likely due to sev dec lvef.  ischemic w/u when euvolemic

## 2019-11-20 NOTE — PN
Progress Note, Physician


History of Present Illness: 





Pt seen and examined at bedside. She is awake and alert. She denies shortness 

of breath. 





- Current Medication List


Current Medications: 


Active Medications





Carvedilol (Coreg -)  3.125 mg PO BID [facility]


   Last Admin: 11/20/19 09:54 Dose:  3.125 mg


Furosemide (Lasix Injection -)  40 mg IVPUSH DAILY [facility]


   Last Admin: 11/20/19 09:51 Dose:  40 mg


Glycerin (Glycerin Suppository Adult -)  1 each MA DAILY PRN


   PRN Reason: CONSTIPATION


   Last Admin: 11/18/19 18:08 Dose:  1 each


Heparin Sodium (Porcine) (Heparin -)  5,000 unit SQ TID [facility]


   Last Admin: 11/20/19 16:12 Dose:  5,000 unit


Pantoprazole Sodium (Protonix -)  40 mg PO DAILY [facility]


   Last Admin: 11/20/19 09:51 Dose:  40 mg


Paroxetine HCl (Paxil -)  30 mg PO DAILY [facility]


   Last Admin: 11/20/19 09:52 Dose:  30 mg


Polyethylene Glycol (Miralax (For Daily Use) -)  17 gm PO BID [facility]


   Last Admin: 11/20/19 09:54 Dose:  17 grams











- Objective


Vital Signs: 


 Vital Signs











Temperature  97.6 F   11/20/19 18:12


 


Pulse Rate  72   11/20/19 18:12


 


Respiratory Rate  18   11/20/19 18:12


 


Blood Pressure  110/59 L  11/20/19 18:12


 


O2 Sat by Pulse Oximetry (%)  100   11/20/19 09:00











Constitutional: Yes: Calm


Eyes: Yes: Conjunctiva Clear


HENT: Yes: Atraumatic


Cardiovascular: Yes: S1, S2


Respiratory: Yes: CTA Bilaterally


Gastrointestinal: Yes: Soft, Abdomen, Obese


Genitourinary: Yes: WNL


Musculoskeletal: Yes: WNL


Edema: Yes


Edema: LLE: Trace, RLE: Trace


Neurological: Yes: Oriented


Psychiatric: Yes: Oriented


Labs: 


 CBC, BMP





 11/20/19 06:10 





 11/20/19 06:10 





 INR, PTT











INR  1.46  (0.83-1.09)  H  11/20/19  06:10    














Problem List





- Problems


(1) Hyponatremia


Code(s): E87.1 - HYPO-OSMOLALITY AND HYPONATREMIA   





(2) Abnormal liver enzymes


Code(s): R74.8 - ABNORMAL LEVELS OF OTHER SERUM ENZYMES   





(3) New onset of congestive heart failure


Code(s): I50.9 - HEART FAILURE, UNSPECIFIED   





Assessment/Plan


 Current Medications











Generic Name Dose Route Start Last Admin





  Trade Name Freq  PRN Reason Stop Dose Admin


 


Carvedilol  3.125 mg  11/15/19 22:00  11/20/19 09:54





  Coreg -  PO   3.125 mg





  BID KARLA   Administration





     





     





     





     


 


Furosemide  40 mg  11/15/19 16:00  11/20/19 09:51





  Lasix Injection -  IVPUSH   40 mg





  DAILY KARLA   Administration





     





     





     





     


 


Glycerin  1 each  11/18/19 15:18  11/18/19 18:08





  Glycerin Suppository Adult -  MA   1 each





  DAILY PRN   Administration





  CONSTIPATION   





     





     





     


 


Heparin Sodium (Porcine)  5,000 unit  11/15/19 06:00  11/20/19 16:12





  Heparin -  SQ   5,000 unit





  TID KARLA   Administration





     





     





     





     


 


Pantoprazole Sodium  40 mg  11/19/19 10:00  11/20/19 09:51





  Protonix -  PO   40 mg





  DAILY KARLA   Administration





     





     





     





     


 


Paroxetine HCl  30 mg  11/19/19 10:00  11/20/19 09:52





  Paxil -  PO   30 mg





  DAILY KARLA   Administration





     





     





     





     


 


Polyethylene Glycol  17 gm  11/19/19 22:00  11/20/19 09:54





  Miralax (For Daily Use) -  PO   17 grams





  BID KARLA   Administration





     





     





     





     




















Impression


1. hyponatremia


2. chf


3. transaminitis


4. hld





Plan


- cont with lasix


- will give a salt tab


- repeat lytes in am


- restrict free water


- discussed with family


- etiology of hyponatremia likely chf 


- will follow

## 2019-11-20 NOTE — PN.GI
GI Progress Note


Subjective: 





GI NOte: Going for MRCP. No BM yet. LFTs continue to improve





- Objective


Vital Signs: 


 Vital Signs











Temperature  97.6 F   11/20/19 10:00


 


Pulse Rate  75   11/20/19 10:00


 


Respiratory Rate  18   11/20/19 10:00


 


Blood Pressure  112/67   11/20/19 10:00


 


O2 Sat by Pulse Oximetry (%)  99   11/19/19 20:29








 Laboratory Tests











  11/19/19 11/20/19





  06:15 06:10


 


Total Bilirubin  2.0 H  1.7 H


 


Direct Bilirubin  1.2 H  1.0 H


 


AST  998 H  648 H


 


ALT  1280 H  1080 H


 


Alkaline Phosphatase   117











Constitutional: Calm


Gastrointestinal Inspection: Yes: Distention


...Auscultate: Yes: Normoactive Bowel Sounds


...Palpate: Yes: Soft, Other (nontender)


...Percussion: Yes: Tympanitic


Labs: 


 CBC, BMP





 11/20/19 06:10 





 11/20/19 06:10 





 INR, PTT











INR  1.46  (0.83-1.09)  H  11/20/19  06:10    














Assessment/Plan





Assessment:


-- Congestive hepatopathy which is responding to afterload reduction


-- Acute hepatitis A excluded by presence of IgG


-- Silent gallstones  


-- Constipation due to hospital sedentary state





Plan:


-- Continue Miralax


-- Anticipate steady hepatic improvement with afterload reduction


-- Await MRCP results





   





Problem List





- Problems


(1) Hepatic congestion


Code(s): K76.1 - CHRONIC PASSIVE CONGESTION OF LIVER   





(2) Hepatitis A antibody positive


Code(s): R76.8 - OTHER SPECIFIED ABNORMAL IMMUNOLOGICAL FINDINGS IN SERUM   





(3) Abnormal liver enzymes


Code(s): R74.8 - ABNORMAL LEVELS OF OTHER SERUM ENZYMES   





(4) Hypertension


Code(s): I10 - ESSENTIAL (PRIMARY) HYPERTENSION   





(5) Hyperlipidemia


Code(s): E78.5 - HYPERLIPIDEMIA, UNSPECIFIED   





(6) New onset of congestive heart failure


Code(s): I50.9 - HEART FAILURE, UNSPECIFIED   





(7) Constipation


Code(s): K59.00 - CONSTIPATION, UNSPECIFIED

## 2019-11-20 NOTE — PN
Teaching Attending Note


Name of Resident: Layla Ly





ATTENDING PHYSICIAN STATEMENT





I saw and evaluated the patient.


I reviewed the resident's note and discussed the case with the resident.


I agree with the resident's findings and plan as documented.








SUBJECTIVE:


Patient is feeling better with NAD


OBJECTIVE:


 Vital Signs











Temperature  97.4 F L  11/20/19 14:44


 


Pulse Rate  72   11/20/19 14:44


 


Respiratory Rate  18   11/20/19 14:44


 


Blood Pressure  111/47 L  11/20/19 14:44


 


O2 Sat by Pulse Oximetry (%)  100   11/20/19 09:00








GENERAL: The patient is awake, alert, and fully oriented, in no acute distress.


HEAD: Normal with no signs of trauma.


EYES: PERRL, extraocular movements intact, sclera anicteric, conjunctiva clear.

  


ENT: Ears normal, oropharynx clear without exudates, moist mucous membranes.


NECK: Trachea midline, full range of motion, supple. 


LUNGS: decreased Breath sounds bl, no wheezes, no crackles, no accessory muscle 

use. 


HEART: Regular rate and rhythm, S1, S2 without murmur, rub or gallop.


ABDOMEN: Soft, NT,ND, normoactive bowel sounds, no guarding, no rebound, no 

hepatosplenomegaly, no masses.


EXTREMITIES: 2+ pulses, warm, well-perfused, no edema. 


NEUROLOGICAL: Cranial nerves II through XII grossly intact. Normal speech, gait 

not observed.


PSYCH: Normal mood, normal affect.


SKIN: Warm, dry, normal turgor, no rashes or lesions noted


 CBCD











WBC  12.9 K/mm3 (4.0-10.0)  H  11/20/19  06:10    


 


RBC  4.81 M/mm3 (3.60-5.2)   11/20/19  06:10    


 


Hgb  12.5 GM/dL (10.7-15.3)   11/20/19  06:10    


 


Hct  39.5 % (32.4-45.2)   11/20/19  06:10    


 


MCV  82.1 fl (80-96)   11/20/19  06:10    


 


MCHC  31.8 g/dl (32.0-36.0)  L  11/20/19  06:10    


 


RDW  14.9 % (11.6-15.6)   11/20/19  06:10    


 


Plt Count  318 K/MM3 (134-434)   11/20/19  06:10    


 


MPV  8.6 fl (7.5-11.1)   11/20/19  06:10    








 CMP











Sodium  124 mmol/L (136-145)  L  11/20/19  06:10    


 


Potassium  4.8 mmol/L (3.5-5.1)   11/20/19  06:10    


 


Chloride  88 mmol/L ()  L  11/20/19  06:10    


 


Carbon Dioxide  28 mmol/L (21-32)   11/20/19  06:10    


 


Anion Gap  7 MMOL/L (8-16)  L  11/20/19  06:10    


 


BUN  41.4 mg/dL (7-18)  H  11/20/19  06:10    


 


Creatinine  1.2 mg/dL (0.55-1.3)   11/20/19  06:10    


 


Random Glucose  283 mg/dL ()  H  11/20/19  06:10    


 


Calcium  8.5 mg/dL (8.5-10.1)   11/20/19  06:10    


 


Total Bilirubin  1.7 mg/dL (0.2-1)  H  11/20/19  06:10    


 


AST  648 U/L (15-37)  H  11/20/19  06:10    


 


ALT  1080 U/L (13-61)  H  11/20/19  06:10    


 


Alkaline Phosphatase  117 U/L ()   11/20/19  06:10    


 


Total Protein  6.0 g/dl (6.4-8.2)  L  11/20/19  06:10    


 


Albumin  2.9 g/dl (3.4-5.0)  L  11/20/19  06:10    








 CARDIAC ENZYMES











Creatine Kinase  158 U/L ()   11/14/19  14:20    


 


Troponin I  0.04 ng/ml (0.00-0.05)   11/14/19  14:20    








 Current Medications











Generic Name Dose Route Start Last Admin





  Trade Name Rubenq  PRN Reason Stop Dose Admin


 


Carvedilol  3.125 mg  11/15/19 22:00  11/20/19 09:54





  Coreg -  PO   3.125 mg





  BID KARLA   Administration





     





     





     





     


 


Furosemide  40 mg  11/15/19 16:00  11/20/19 09:51





  Lasix Injection -  IVPUSH   40 mg





  DAILY KARLA   Administration





     





     





     





     


 


Glycerin  1 each  11/18/19 15:18  11/18/19 18:08





  Glycerin Suppository Adult -  OR   1 each





  DAILY PRN   Administration





  CONSTIPATION   





     





     





     


 


Heparin Sodium (Porcine)  5,000 unit  11/15/19 06:00  11/20/19 16:12





  Heparin -  SQ   5,000 unit





  TID KARLA   Administration





     





     





     





     


 


Pantoprazole Sodium  40 mg  11/19/19 10:00  11/20/19 09:51





  Protonix -  PO   40 mg





  DAILY KARLA   Administration





     





     





     





     


 


Paroxetine HCl  30 mg  11/19/19 10:00  11/20/19 09:52





  Paxil -  PO   30 mg





  DAILY KARLA   Administration





     





     





     





     


 


Polyethylene Glycol  17 gm  11/19/19 22:00  11/20/19 09:54





  Miralax (For Daily Use) -  PO   17 grams





  BID KARLA   Administration





     





     





     





     








 Home Medications











 Medication  Instructions  Recorded


 


Amoxicillin - [Amoxicillin 500mg 500 mg PO TID 11/14/19





Capsule -]  


 


Aspirin [Children's Aspirin] 81 mg PO DAILY 11/14/19


 


Fenofibrate Nanocrystallized 145 mg PO DAILY 11/14/19





[Fenofibrate]  


 


Losartan Potassium 50 mg PO DAILY 11/14/19


 


Lovastatin 20 mg PO DAILY 11/14/19


 


Paroxetine HCl 40 mg PO DAILY 11/14/19


 


Zolpidem Tartrate 10 mg PO DAILY 11/14/19

















ASSESSMENT AND PLAN:


87 y/o lady with h/o HTN, HLP, Hysterectomy who presented with fever , chills x 

1 -2 weeks and was found to have transaminitis . 





# New onset acute systolic heart failure: with severely reduced EF. 





# Acute transaminitis: due to congestive hepatopathy will continue to monitor 

LFts





# MASSIEL: probably prerenal azotemia ( cardio-renal sx ) .continue lasix a 


 


# H/o HTN: hold losratan . cont coreg .monitor BP 





DVT PX : heparin SQ

## 2019-11-21 LAB
ALBUMIN SERPL-MCNC: 2.8 G/DL (ref 3.4–5)
ALP SERPL-CCNC: 111 U/L (ref 45–117)
ALT SERPL-CCNC: 801 U/L (ref 13–61)
ANION GAP SERPL CALC-SCNC: 9 MMOL/L (ref 8–16)
AST SERPL-CCNC: 340 U/L (ref 15–37)
BILIRUB CONJ SERPL-MCNC: 1 MG/DL (ref 0–0.2)
BILIRUB SERPL-MCNC: 1.5 MG/DL (ref 0.2–1)
BUN SERPL-MCNC: 42.5 MG/DL (ref 7–18)
CALCIUM SERPL-MCNC: 8.1 MG/DL (ref 8.5–10.1)
CHLORIDE SERPL-SCNC: 89 MMOL/L (ref 98–107)
CO2 SERPL-SCNC: 29 MMOL/L (ref 21–32)
CREAT SERPL-MCNC: 1.3 MG/DL (ref 0.55–1.3)
DEPRECATED RDW RBC AUTO: 15 % (ref 11.6–15.6)
GLUCOSE SERPL-MCNC: 250 MG/DL (ref 74–106)
HCT VFR BLD CALC: 38.2 % (ref 32.4–45.2)
HGB BLD-MCNC: 12.1 GM/DL (ref 10.7–15.3)
INR BLD: 1.38 (ref 0.83–1.09)
MCH RBC QN AUTO: 25.9 PG (ref 25.7–33.7)
MCHC RBC AUTO-ENTMCNC: 31.7 G/DL (ref 32–36)
MCV RBC: 81.7 FL (ref 80–96)
PLATELET # BLD AUTO: 338 K/MM3 (ref 134–434)
PMV BLD: 8.2 FL (ref 7.5–11.1)
POTASSIUM SERPLBLD-SCNC: 4.5 MMOL/L (ref 3.5–5.1)
PROT SERPL-MCNC: 5.8 G/DL (ref 6.4–8.2)
PT PNL PPP: 16.3 SEC (ref 9.7–13)
RBC # BLD AUTO: 4.68 M/MM3 (ref 3.6–5.2)
SODIUM SERPL-SCNC: 126 MMOL/L (ref 136–145)
WBC # BLD AUTO: 11.9 K/MM3 (ref 4–10)

## 2019-11-21 RX ADMIN — POLYETHYLENE GLYCOL 3350 SCH GRAMS: 17 POWDER, FOR SOLUTION ORAL at 21:39

## 2019-11-21 RX ADMIN — CARVEDILOL SCH MG: 3.12 TABLET, FILM COATED ORAL at 09:56

## 2019-11-21 RX ADMIN — HEPARIN SODIUM SCH UNIT: 5000 INJECTION, SOLUTION INTRAVENOUS; SUBCUTANEOUS at 15:09

## 2019-11-21 RX ADMIN — HEPARIN SODIUM SCH UNIT: 5000 INJECTION, SOLUTION INTRAVENOUS; SUBCUTANEOUS at 21:39

## 2019-11-21 RX ADMIN — PANTOPRAZOLE SODIUM SCH MG: 40 TABLET, DELAYED RELEASE ORAL at 09:56

## 2019-11-21 RX ADMIN — HEPARIN SODIUM SCH UNIT: 5000 INJECTION, SOLUTION INTRAVENOUS; SUBCUTANEOUS at 05:29

## 2019-11-21 RX ADMIN — CARVEDILOL SCH MG: 3.12 TABLET, FILM COATED ORAL at 21:39

## 2019-11-21 RX ADMIN — POLYETHYLENE GLYCOL 3350 SCH GRAMS: 17 POWDER, FOR SOLUTION ORAL at 09:58

## 2019-11-21 RX ADMIN — FUROSEMIDE SCH MG: 10 INJECTION, SOLUTION INTRAVENOUS at 09:56

## 2019-11-21 NOTE — PN
Teaching Attending Note


Name of Resident: Layla Ly





ATTENDING PHYSICIAN STATEMENT





I saw and evaluated the patient.


I reviewed the resident's note and discussed the case with the resident.


I agree with the resident's findings and plan as documented.








SUBJECTIVE:


Patient is feeling better with NAD. 





 Vital Signs











Temperature  98.8 F   11/21/19 11:26


 


Pulse Rate  75   11/21/19 11:26


 


Respiratory Rate  18   11/21/19 11:26


 


Blood Pressure  134/74   11/21/19 11:26


 


O2 Sat by Pulse Oximetry (%)  100   11/21/19 09:00








GENERAL: The patient is awake, alert, and fully oriented, in no acute distress.


HEAD: Normal with no signs of trauma.


EYES: PERRL, extraocular movements intact, sclera anicteric, conjunctiva clear. 


ENT: Ears normal,  oropharynx clear without exudates, moist mucous membranes.


NECK: Trachea midline, full range of motion, supple. 


LUNGS: Breath sounds equal, clear to auscultation bilaterally, no wheezes, no 

crackles, no accessory muscle use. 


HEART: Regular rate and rhythm, S1, S2 without murmur, rub or gallop.


ABDOMEN: Soft, NT,ND, normoactive bowel sounds, no guarding, no rebound, no 

hepatosplenomegaly, no masses.


EXTREMITIES: 2+ pulses, warm, well-perfused, no edema. 


NEUROLOGICAL: Cranial nerves II through XII grossly intact. Normal speech, gait 

not observed.


PSYCH: Normal mood, normal affect.


SKIN: Warm, dry, normal turgor, no rashes or lesions noted





 CBCD











WBC  11.9 K/mm3 (4.0-10.0)  H  11/21/19  07:20    


 


RBC  4.68 M/mm3 (3.60-5.2)   11/21/19  07:20    


 


Hgb  12.1 GM/dL (10.7-15.3)   11/21/19  07:20    


 


Hct  38.2 % (32.4-45.2)   11/21/19  07:20    


 


MCV  81.7 fl (80-96)   11/21/19  07:20    


 


MCHC  31.7 g/dl (32.0-36.0)  L  11/21/19  07:20    


 


RDW  15.0 % (11.6-15.6)   11/21/19  07:20    


 


Plt Count  338 K/MM3 (134-434)   11/21/19  07:20    


 


MPV  8.2 fl (7.5-11.1)   11/21/19  07:20    








 CMP











Sodium  126 mmol/L (136-145)  L  11/21/19  07:20    


 


Potassium  4.5 mmol/L (3.5-5.1)   11/21/19  07:20    


 


Chloride  89 mmol/L ()  L  11/21/19  07:20    


 


Carbon Dioxide  29 mmol/L (21-32)   11/21/19  07:20    


 


Anion Gap  9 MMOL/L (8-16)   11/21/19  07:20    


 


BUN  42.5 mg/dL (7-18)  H  11/21/19  07:20    


 


Creatinine  1.3 mg/dL (0.55-1.3)   11/21/19  07:20    


 


Random Glucose  250 mg/dL ()  H  11/21/19  07:20    


 


Calcium  8.1 mg/dL (8.5-10.1)  L  11/21/19  07:20    


 


Total Bilirubin  1.5 mg/dL (0.2-1)  H  11/21/19  07:20    


 


AST  340 U/L (15-37)  H  11/21/19  07:20    


 


ALT  801 U/L (13-61)  H  11/21/19  07:20    


 


Alkaline Phosphatase  111 U/L ()   11/21/19  07:20    


 


Total Protein  5.8 g/dl (6.4-8.2)  L  11/21/19  07:20    


 


Albumin  2.8 g/dl (3.4-5.0)  L  11/21/19  07:20    








 CARDIAC ENZYMES











Creatine Kinase  158 U/L ()   11/14/19  14:20    


 


Troponin I  0.04 ng/ml (0.00-0.05)   11/14/19  14:20    








 Current Medications











Generic Name Dose Route Start Last Admin





  Trade Name Freq  PRN Reason Stop Dose Admin


 


Benzocaine/Menthol  1 each  11/20/19 20:15  11/20/19 22:06





  Cepacol Lozenge -  MM   1 each





  PRN PRN   Administration





  SORE THROAT   





     





     





     


 


Carvedilol  3.125 mg  11/15/19 22:00  11/21/19 09:56





  Coreg -  PO   3.125 mg





  BID KARLA   Administration





     





     





     





     


 


Furosemide  40 mg  11/15/19 16:00  11/21/19 09:56





  Lasix Injection -  IVPUSH   40 mg





  DAILY KARLA   Administration





     





     





     





     


 


Glycerin  1 each  11/18/19 15:18  11/18/19 18:08





  Glycerin Suppository Adult -  OH   1 each





  DAILY PRN   Administration





  CONSTIPATION   





     





     





     


 


Heparin Sodium (Porcine)  5,000 unit  11/15/19 06:00  11/21/19 15:09





  Heparin -  SQ   5,000 unit





  TID KARLA   Administration





     





     





     





     


 


Pantoprazole Sodium  40 mg  11/19/19 10:00  11/21/19 09:56





  Protonix -  PO   40 mg





  DAILY KARLA   Administration





     





     





     





     


 


Paroxetine HCl  30 mg  11/19/19 10:00  11/21/19 09:56





  Paxil -  PO   30 mg





  DAILY KARLA   Administration





     





     





     





     


 


Polyethylene Glycol  17 gm  11/19/19 22:00  11/21/19 09:58





  Miralax (For Daily Use) -  PO   17 grams





  BID KARLA   Administration





     





     





     





     








home Medications











 Medication  Instructions  Recorded


 


Amoxicillin - [Amoxicillin 500mg 500 mg PO TID 11/14/19





Capsule -]  


 


Aspirin [Children's Aspirin] 81 mg PO DAILY 11/14/19


 


Fenofibrate Nanocrystallized 145 mg PO DAILY 11/14/19





[Fenofibrate]  


 


Losartan Potassium 50 mg PO DAILY 11/14/19


 


Lovastatin 20 mg PO DAILY 11/14/19


 


Paroxetine HCl 40 mg PO DAILY 11/14/19


 


Zolpidem Tartrate 10 mg PO DAILY 11/14/19

















ASSESSMENT AND PLAN:


89 y/o lady with h/o HTN, HLP, Hysterectomy who presented with fever , chills x 

1 -2 weeks and was found to have transaminitis . 





# New onset acute systolic heart failure: with severely reduced EF. 


# Acute transaminitis: due to congestive hepatopathy. 


- cont supportive measures 


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 d/w ID    


- cont to hold statin and fibrates 


- cont Paroxitine at a lower dose 30. 





# MASSIEL: probably prerenal azotemia ( cardio-renal sx ) .  cont diuresis 


- cont to hold losartan . if renal function remains stable, then might start a  

low dose 





# H/o HTN: hold losratan . cont coreg .monitor BP 





DVT PX : heparin SQ

## 2019-11-21 NOTE — PN
Progress Note, Physician


History of Present Illness: 





Pt seen and examined at bedside. She denies shortness of breath. 





- Current Medication List


Current Medications: 


Active Medications





Benzocaine/Menthol (Cepacol Lozenge -)  1 each MM PRN PRN


   PRN Reason: SORE THROAT


   Last Admin: 11/20/19 22:06 Dose:  1 each


Carvedilol (Coreg -)  3.125 mg PO BID Counts include 234 beds at the [facility]


   Last Admin: 11/21/19 09:56 Dose:  3.125 mg


Furosemide (Lasix Injection -)  40 mg IVPUSH DAILY Counts include 234 beds at the [facility]


   Last Admin: 11/21/19 09:56 Dose:  40 mg


Glycerin (Glycerin Suppository Adult -)  1 each ID DAILY PRN


   PRN Reason: CONSTIPATION


   Last Admin: 11/18/19 18:08 Dose:  1 each


Heparin Sodium (Porcine) (Heparin -)  5,000 unit SQ TID Counts include 234 beds at the [facility]


   Last Admin: 11/21/19 15:09 Dose:  5,000 unit


Pantoprazole Sodium (Protonix -)  40 mg PO DAILY Counts include 234 beds at the [facility]


   Last Admin: 11/21/19 09:56 Dose:  40 mg


Paroxetine HCl (Paxil -)  30 mg PO DAILY Counts include 234 beds at the [facility]


   Last Admin: 11/21/19 09:56 Dose:  30 mg


Polyethylene Glycol (Miralax (For Daily Use) -)  17 gm PO BID Counts include 234 beds at the [facility]


   Last Admin: 11/21/19 09:58 Dose:  17 grams











- Objective


Vital Signs: 


 Vital Signs











Temperature  98.8 F   11/21/19 11:26


 


Pulse Rate  75   11/21/19 11:26


 


Respiratory Rate  18   11/21/19 11:26


 


Blood Pressure  134/74   11/21/19 11:26


 


O2 Sat by Pulse Oximetry (%)  100   11/21/19 09:00











Constitutional: Yes: Calm


Eyes: Yes: Conjunctiva Clear


HENT: Yes: Atraumatic


Neck: Yes: Supple


Cardiovascular: Yes: S1, S2


Respiratory: Yes: Rhonchi


Gastrointestinal: Yes: Soft, Abdomen, Obese


Musculoskeletal: Yes: WNL


Edema: Yes


Edema: LLE: Trace, RLE: Trace


Neurological: Yes: Oriented


Psychiatric: Yes: Oriented


Labs: 


 CBC, BMP





 11/21/19 07:20 





 11/21/19 07:20 





 INR, PTT











INR  1.38  (0.83-1.09)  H  11/21/19  07:20    














Problem List





- Problems


(1) Hyponatremia


Code(s): E87.1 - HYPO-OSMOLALITY AND HYPONATREMIA   





(2) Abnormal liver enzymes


Code(s): R74.8 - ABNORMAL LEVELS OF OTHER SERUM ENZYMES   





(3) New onset of congestive heart failure


Code(s): I50.9 - HEART FAILURE, UNSPECIFIED   





Assessment/Plan


 Current Medications











Generic Name Dose Route Start Last Admin





  Trade Name Freq  PRN Reason Stop Dose Admin


 


Benzocaine/Menthol  1 each  11/20/19 20:15  11/20/19 22:06





  Cepacol Lozenge -  MM   1 each





  PRN PRN   Administration





  SORE THROAT   





     





     





     


 


Carvedilol  3.125 mg  11/15/19 22:00  11/21/19 09:56





  Coreg -  PO   3.125 mg





  BID KARLA   Administration





     





     





     





     


 


Furosemide  40 mg  11/15/19 16:00  11/21/19 09:56





  Lasix Injection -  IVPUSH   40 mg





  DAILY KARLA   Administration





     





     





     





     


 


Glycerin  1 each  11/18/19 15:18  11/18/19 18:08





  Glycerin Suppository Adult -  ID   1 each





  DAILY PRN   Administration





  CONSTIPATION   





     





     





     


 


Heparin Sodium (Porcine)  5,000 unit  11/15/19 06:00  11/21/19 15:09





  Heparin -  SQ   5,000 unit





  TID KARLA   Administration





     





     





     





     


 


Pantoprazole Sodium  40 mg  11/19/19 10:00  11/21/19 09:56





  Protonix -  PO   40 mg





  DAILY KARLA   Administration





     





     





     





     


 


Paroxetine HCl  30 mg  11/19/19 10:00  11/21/19 09:56





  Paxil -  PO   30 mg





  DAILY KARLA   Administration





     





     





     





     


 


Polyethylene Glycol  17 gm  11/19/19 22:00  11/21/19 09:58





  Miralax (For Daily Use) -  PO   17 grams





  BID KARLA   Administration





     





     





     





     


 


Sodium Chloride  1 gm  11/21/19 16:09  





  Sodium Chloride Tablet -  PO  11/21/19 16:10  





  ONCE ONE   





     





     





     





     

















Impression


1. hyponatremia


2. chf


3. transaminitis


4. hld





Plan


- cont iv lasix


- repeat labs in am


- sodium improving


- restrict free water


- etiology of hyponatremia likely chf 


- will follow

## 2019-11-21 NOTE — PN
Physical Exam: 


SUBJECTIVE: Patient seen and examined. Pt reports shortness of breath is the 

same as yesterday, but she feels comfortable. No other complaints at this time.





OBJECTIVE:





 Vital Signs











 Period  Temp  Pulse  Resp  BP Sys/Reyes  Pulse Ox


 


 Last 24 Hr  97.6 F-98.8 F  69-75  18-22  106-134/59-74  











GENERAL: The patient is awake, alert, and fully oriented, in no acute distress.


HEAD: Normal with no signs of trauma.


EYES: PERRL, extraocular movements intact, conjunctiva clear.


ENT: Ears normal, nares patent, moist mucous membranes.


NECK: Trachea midline, full range of motion, supple. 


LUNGS: Clear to auscultation


HEART: Regular rate and rhythm, no murmur appreciated


ABDOMEN: Soft, nontender, nondistended, normoactive bowel sounds, no guarding


EXTREMITIES: Warm, well-perfused, no edema. 


NEUROLOGICAL: Cranial nerves II through XII grossly intact. Normal speech


PSYCH: Normal mood, normal affect.


SKIN: Warm, dry, normal turgor





urine martinez 1450 cc








 Laboratory Results - last 24 hr











  11/19/19 11/21/19 11/21/19





  06:15 07:20 07:20


 


WBC    11.9 H


 


RBC    4.68


 


Hgb    12.1


 


Hct    38.2


 


MCV    81.7


 


MCH    25.9


 


MCHC    31.7 L


 


RDW    15.0


 


Plt Count    338


 


MPV    8.2


 


PT with INR   


 


INR   


 


Sodium   126 L 


 


Potassium   4.5 


 


Chloride   89 L 


 


Carbon Dioxide   29 


 


Anion Gap   9 


 


BUN   42.5 H 


 


Creatinine   1.3 


 


Est GFR (CKD-EPI)AfAm   42.42 


 


Est GFR (CKD-EPI)NonAf   36.60 


 


Random Glucose   250 H 


 


Calcium   8.1 L 


 


Total Bilirubin   1.5 H 


 


Direct Bilirubin   1.0 H 


 


AST   340 H 


 


ALT   801 H 


 


Alkaline Phosphatase   111 


 


Total Protein   5.8 L 


 


Albumin   2.8 L 


 


Cortisol AM Sample  33.7 H  














  11/21/19





  07:20


 


WBC 


 


RBC 


 


Hgb 


 


Hct 


 


MCV 


 


MCH 


 


MCHC 


 


RDW 


 


Plt Count 


 


MPV 


 


PT with INR  16.30 H


 


INR  1.38 H


 


Sodium 


 


Potassium 


 


Chloride 


 


Carbon Dioxide 


 


Anion Gap 


 


BUN 


 


Creatinine 


 


Est GFR (CKD-EPI)AfAm 


 


Est GFR (CKD-EPI)NonAf 


 


Random Glucose 


 


Calcium 


 


Total Bilirubin 


 


Direct Bilirubin 


 


AST 


 


ALT 


 


Alkaline Phosphatase 


 


Total Protein 


 


Albumin 


 


Cortisol AM Sample 








Active Medications











Generic Name Dose Route Start Last Admin





  Trade Name Freq  PRN Reason Stop Dose Admin


 


Benzocaine/Menthol  1 each  11/20/19 20:15  11/20/19 22:06





  Cepacol Lozenge -  MM   1 each





  PRN PRN   Administration





  SORE THROAT   





     





     





     


 


Carvedilol  3.125 mg  11/15/19 22:00  11/21/19 09:56





  Coreg -  PO   3.125 mg





  BID KARLA   Administration





     





     





     





     


 


Furosemide  40 mg  11/15/19 16:00  11/21/19 09:56





  Lasix Injection -  IVPUSH   40 mg





  DAILY KARLA   Administration





     





     





     





     


 


Glycerin  1 each  11/18/19 15:18  11/18/19 18:08





  Glycerin Suppository Adult -  ME   1 each





  DAILY PRN   Administration





  CONSTIPATION   





     





     





     


 


Heparin Sodium (Porcine)  5,000 unit  11/15/19 06:00  11/21/19 15:09





  Heparin -  SQ   5,000 unit





  TID KARLA   Administration





     





     





     





     


 


Pantoprazole Sodium  40 mg  11/19/19 10:00  11/21/19 09:56





  Protonix -  PO   40 mg





  DAILY KARLA   Administration





     





     





     





     


 


Paroxetine HCl  30 mg  11/19/19 10:00  11/21/19 09:56





  Paxil -  PO   30 mg





  DAILY KARLA   Administration





     





     





     





     


 


Polyethylene Glycol  17 gm  11/19/19 22:00  11/21/19 09:58





  Miralax (For Daily Use) -  PO   17 grams





  BID KARLA   Administration





     





     





     





     











ASSESSMENT/PLAN:


Ms. Toledo is an 89y/o female with HTN and HLD who presents with progressive 

shortness of breath and fatigue. Per daughter, the patient has become 

increasingly dyspneic on exertion over the last several months and especially 

in the last week. Pt was initially febrile and had leukocytosis but fever 

resolved and white count is borderline normal. Pt was found to have severe 

transaminitis and lactic acidosis. Echo showed LVH with EF 20-25% and RV 

pressure 40-50 with severe TR.





#acute HFrEF


Unknown if pt has previous hx of heart failure due to lack of follow up.


-Lasix 40mg IV


-in pt stress test after fluid improvement


-carvedilol 3.125mg BID


-nuclear stress test





#transaminitis possibly 2/2 congestion vs cardiorenal syndrome


continued improvement


-GI following- paroxetine dose lowered


-acute hep A negative (IgG present)


-ID following


-nephrology following


-MRI- no abnormalities of liver, gallstones present in gallbladder but no 

infection


-monitor INR- downtrending as well





#hyponatremia likely 2/2 HF


124-->126


-water restriction





#constipation


-miralax





#HTN


-carvedilol





DVT Ppx


heparin





FEN


fluid restriction


monitor Na


Na restricted diet











Visit type





- Emergency Visit


Emergency Visit: Yes


ED Registration Date: 11/14/19


Care time: The patient presented to the Emergency Department on the above date 

and was hospitalized for further evaluation of their emergent condition.





- New Patient


This patient is new to me today: No





- Critical Care


Critical Care patient: No





- Discharge Referral


Referred to [facility] No





ATTENDING PHYSICIAN STATEMENT





I saw and evaluated the patient.


I reviewed the resident's note and discussed the case with the resident.


I agree with the resident's findings and plan as documented.








SUBJECTIVE:








OBJECTIVE:








ASSESSMENT AND PLAN:

## 2019-11-21 NOTE — PN
Progress Note (short form)





- Note


Progress Note: 


s: no chest pain, palps, dizziness, dyspnea





  


  Vital Signs











 Period  Temp  Pulse  Resp  BP Sys/Reyes  Pulse Ox


 


 Last 24 Hr  97.6 F-98.8 F  69-75  18-22  106-134/59-74  











nad no jvd


rrr s1s2 no mrg


cta bl nl ef


aao3


no le e/c/c


abd nt nd pos bs


no jaundice diaphoresis





  Current Medications





Benzocaine/Menthol (Cepacol Lozenge -)  1 each MM PRN PRN


   PRN Reason: SORE THROAT


   Last Admin: 11/20/19 22:06 Dose:  1 each


Carvedilol (Coreg -)  3.125 mg PO BID [facility]


   Last Admin: 11/21/19 09:56 Dose:  3.125 mg


Furosemide (Lasix Injection -)  40 mg IVPUSH DAILY [facility]


   Last Admin: 11/21/19 09:56 Dose:  40 mg


Glycerin (Glycerin Suppository Adult -)  1 each NE DAILY PRN


   PRN Reason: CONSTIPATION


   Last Admin: 11/18/19 18:08 Dose:  1 each


Heparin Sodium (Porcine) (Heparin -)  5,000 unit SQ TID [facility]


   Last Admin: 11/21/19 15:09 Dose:  5,000 unit


Pantoprazole Sodium (Protonix -)  40 mg PO DAILY [facility]


   Last Admin: 11/21/19 09:56 Dose:  40 mg


Paroxetine HCl (Paxil -)  30 mg PO DAILY [facility]


   Last Admin: 11/21/19 09:56 Dose:  30 mg


Polyethylene Glycol (Miralax (For Daily Use) -)  17 gm PO BID [facility]


   Last Admin: 11/21/19 09:58 Dose:  17 grams








ecg: sr lbbb





cxr: clear lungs





echo 11/2019:  lvef 20-25, global hk, nl rv, sev tr, mild lae, mod mr, rvsp 40-

50








a/p:  88 f hx hld, ckd (bl 1.2), here with sob.





sob, acute systolic chf:


-no known hx hrt dz but echo here showing reduced lvef and sev tr


-seems that pt has component of vol overload with hepatic congestion causing 

elevated lfts (also possible from hep A)


-has been getting lasix 40 iv qd with improvement in cr and sob, LFTs coming 

down as well, edema improving


- monitor daily wts, lytes


- cont current lasix


-started coreg for chf regimen.  hold off on ace/arb until cr/lytes more stable


- mibi for ischemic evaluation





hld:


-statin held 2/2 elevated lfts





yane on ckd:


-monitor cr with diuresis, possibly cardiorenal as cr improved with lasix








abnl ecg:


-lbbb likely due to sev dec lvef


- mibi ordered for ischemic evaluation

## 2019-11-22 LAB
ALBUMIN SERPL-MCNC: 2.7 G/DL (ref 3.4–5)
ALP SERPL-CCNC: 104 U/L (ref 45–117)
ALT SERPL-CCNC: 611 U/L (ref 13–61)
ANION GAP SERPL CALC-SCNC: 9 MMOL/L (ref 8–16)
AST SERPL-CCNC: 186 U/L (ref 15–37)
BILIRUB CONJ SERPL-MCNC: 0.9 MG/DL (ref 0–0.2)
BILIRUB SERPL-MCNC: 1.6 MG/DL (ref 0.2–1)
BUN SERPL-MCNC: 34.1 MG/DL (ref 7–18)
CALCIUM SERPL-MCNC: 8.6 MG/DL (ref 8.5–10.1)
CHLORIDE SERPL-SCNC: 91 MMOL/L (ref 98–107)
CO2 SERPL-SCNC: 30 MMOL/L (ref 21–32)
CREAT SERPL-MCNC: 1.2 MG/DL (ref 0.55–1.3)
DEPRECATED RDW RBC AUTO: 15.1 % (ref 11.6–15.6)
GLUCOSE SERPL-MCNC: 235 MG/DL (ref 74–106)
HCT VFR BLD CALC: 37.7 % (ref 32.4–45.2)
HGB BLD-MCNC: 11.9 GM/DL (ref 10.7–15.3)
INR BLD: 1.26 (ref 0.83–1.09)
MCH RBC QN AUTO: 25.7 PG (ref 25.7–33.7)
MCHC RBC AUTO-ENTMCNC: 31.7 G/DL (ref 32–36)
MCV RBC: 81.2 FL (ref 80–96)
PLATELET # BLD AUTO: 332 K/MM3 (ref 134–434)
PMV BLD: 8 FL (ref 7.5–11.1)
POTASSIUM SERPLBLD-SCNC: 4.7 MMOL/L (ref 3.5–5.1)
PROT SERPL-MCNC: 5.8 G/DL (ref 6.4–8.2)
PT PNL PPP: 14.9 SEC (ref 9.7–13)
RBC # BLD AUTO: 4.64 M/MM3 (ref 3.6–5.2)
SODIUM SERPL-SCNC: 131 MMOL/L (ref 136–145)
WBC # BLD AUTO: 11.3 K/MM3 (ref 4–10)

## 2019-11-22 RX ADMIN — POLYETHYLENE GLYCOL 3350 SCH GRAMS: 17 POWDER, FOR SOLUTION ORAL at 21:24

## 2019-11-22 RX ADMIN — CARVEDILOL SCH MG: 3.12 TABLET, FILM COATED ORAL at 13:24

## 2019-11-22 RX ADMIN — HEPARIN SODIUM SCH UNIT: 5000 INJECTION, SOLUTION INTRAVENOUS; SUBCUTANEOUS at 14:06

## 2019-11-22 RX ADMIN — CARVEDILOL SCH MG: 3.12 TABLET, FILM COATED ORAL at 21:19

## 2019-11-22 RX ADMIN — PANTOPRAZOLE SODIUM SCH MG: 40 TABLET, DELAYED RELEASE ORAL at 13:27

## 2019-11-22 RX ADMIN — HEPARIN SODIUM SCH UNIT: 5000 INJECTION, SOLUTION INTRAVENOUS; SUBCUTANEOUS at 21:19

## 2019-11-22 RX ADMIN — HEPARIN SODIUM SCH UNIT: 5000 INJECTION, SOLUTION INTRAVENOUS; SUBCUTANEOUS at 06:45

## 2019-11-22 RX ADMIN — FUROSEMIDE SCH MG: 10 INJECTION, SOLUTION INTRAVENOUS at 13:24

## 2019-11-22 RX ADMIN — POLYETHYLENE GLYCOL 3350 SCH GRAMS: 17 POWDER, FOR SOLUTION ORAL at 13:25

## 2019-11-22 NOTE — PN.GI
GI Progress Note


Subjective: 





GI NOTe: LFTS have almost normalized. NO GI complaints





- Objective


Vital Signs: 


 Vital Signs











Temperature  98.1 F   11/22/19 06:00


 


Pulse Rate  70   11/22/19 06:00


 


Respiratory Rate  18   11/22/19 08:54


 


Blood Pressure  131/72   11/22/19 06:00


 


O2 Sat by Pulse Oximetry (%)  100   11/22/19 08:54











Constitutional: Calm


...Auscultate: Yes: Normoactive Bowel Sounds


...Palpate: Yes: Soft, Other (nontender)


Labs: 


 CBC, BMP





 11/22/19 06:15 





 11/22/19 06:15 





 INR, PTT











INR  1.26  (0.83-1.09)  H  11/22/19  08:27    














Assessment/Plan





Assessment:


-- Congestive hepatopathy which is responding to afterload reduction


-- Acute hepatitis A excluded by presence of IgG


-- Silent gallstones  . NO MRCP evidence of CBD stones or obstruction


-- Constipation due to hospital sedentary state





Plan:


-- Continue Miralax


-- Anticipate steady hepatic improvement with afterload reduction





   





Problem List





- Problems


(1) Hepatic congestion


Code(s): K76.1 - CHRONIC PASSIVE CONGESTION OF LIVER   





(2) Hepatitis A antibody positive


Code(s): R76.8 - OTHER SPECIFIED ABNORMAL IMMUNOLOGICAL FINDINGS IN SERUM   





(3) Abnormal liver enzymes


Code(s): R74.8 - ABNORMAL LEVELS OF OTHER SERUM ENZYMES   





(4) Hypertension


Code(s): I10 - ESSENTIAL (PRIMARY) HYPERTENSION   





(5) Hyperlipidemia


Code(s): E78.5 - HYPERLIPIDEMIA, UNSPECIFIED   





(6) New onset of congestive heart failure


Code(s): I50.9 - HEART FAILURE, UNSPECIFIED   





(7) Constipation


Code(s): K59.00 - CONSTIPATION, UNSPECIFIED

## 2019-11-22 NOTE — PN
Physical Exam: 


SUBJECTIVE: Patient seen and examined. She reports feeling the same as the last 

several days in terms of breathing. She was sleeping lying flat.








OBJECTIVE:





 Vital Signs











 Period  Temp  Pulse  Resp  BP Sys/Reyes  Pulse Ox


 


 Last 24 Hr  98.0 F-98.2 F  66-75  18-20  121-131/62-72  100-100











GENERAL: The patient is awake, alert, and fully oriented, in no acute distress.


HEAD: Normal with no signs of trauma.


EYES: PERRL, extraocular movements intact, conjunctiva clear.


ENT: Ears normal, nares patent, moist mucous membranes.


NECK: Trachea midline, full range of motion, supple. 


LUNGS: Clear to auscultation


HEART: Regular rate and rhythm, no murmur appreciated


ABDOMEN: Soft, nontender, nondistended, normoactive bowel sounds, no guarding


EXTREMITIES: Warm, well-perfused, no edema. 


NEUROLOGICAL: Cranial nerves II through XII grossly intact. Normal speech


PSYCH: Normal mood, normal affect.


SKIN: Warm, dry, normal turgor





martinez 1700cc








 Laboratory Results - last 24 hr











  11/17/19 11/22/19 11/22/19





  17:00 06:15 06:15


 


WBC    11.3 H


 


RBC    4.64


 


Hgb    11.9


 


Hct    37.7


 


MCV    81.2


 


MCH    25.7


 


MCHC    31.7 L


 


RDW    15.1


 


Plt Count    332


 


MPV    8.0


 


PT with INR   


 


INR   


 


Sodium   131 L 


 


Potassium   4.7 


 


Chloride   91 L 


 


Carbon Dioxide   30 


 


Anion Gap   9 


 


BUN   34.1 H 


 


Creatinine   1.2 


 


Est GFR (CKD-EPI)AfAm   46.73 


 


Est GFR (CKD-EPI)NonAf   40.32 


 


Random Glucose   235 H 


 


Calcium   8.6 


 


Total Bilirubin   1.6 H 


 


Direct Bilirubin   0.9 H 


 


AST   186 H 


 


ALT   611 H 


 


Alkaline Phosphatase   104 


 


Total Protein   5.8 L 


 


Albumin   2.7 L 


 


Adenovirus (PCR)  Negative  


 


Human Metapneumovir PCR  Negative  


 


Influenza A (H1) PCR  Negative  


 


Influenza B (RT-PCR)  Negative  


 


Parainfluenza 1 (PCR)  Negative  


 


Parainfluenza 2 (PCR)  Negative  


 


Parainfluenza 3 (PCR)  Negative  


 


RSV Type A (PCR)  Negative  


 


RSV Type B (PCR)  Negative  


 


Rhinovirus (PCR)  Negative  














  11/22/19





  08:27


 


WBC 


 


RBC 


 


Hgb 


 


Hct 


 


MCV 


 


MCH 


 


MCHC 


 


RDW 


 


Plt Count 


 


MPV 


 


PT with INR  14.90 H


 


INR  1.26 H


 


Sodium 


 


Potassium 


 


Chloride 


 


Carbon Dioxide 


 


Anion Gap 


 


BUN 


 


Creatinine 


 


Est GFR (CKD-EPI)AfAm 


 


Est GFR (CKD-EPI)NonAf 


 


Random Glucose 


 


Calcium 


 


Total Bilirubin 


 


Direct Bilirubin 


 


AST 


 


ALT 


 


Alkaline Phosphatase 


 


Total Protein 


 


Albumin 


 


Adenovirus (PCR) 


 


Human Metapneumovir PCR 


 


Influenza A (H1) PCR 


 


Influenza B (RT-PCR) 


 


Parainfluenza 1 (PCR) 


 


Parainfluenza 2 (PCR) 


 


Parainfluenza 3 (PCR) 


 


RSV Type A (PCR) 


 


RSV Type B (PCR) 


 


Rhinovirus (PCR) 








Active Medications











Generic Name Dose Route Start Last Admin





  Trade Name Freq  PRN Reason Stop Dose Admin


 


Benzocaine/Menthol  1 each  11/20/19 20:15  11/20/19 22:06





  Cepacol Lozenge -  MM   1 each





  PRN PRN   Administration





  SORE THROAT   





     





     





     


 


Carvedilol  3.125 mg  11/15/19 22:00  11/22/19 13:24





  Coreg -  PO   3.125 mg





  BID KARLA   Administration





     





     





     





     


 


Furosemide  40 mg  11/15/19 16:00  11/22/19 13:24





  Lasix Injection -  IVPUSH   40 mg





  DAILY KARLA   Administration





     





     





     





     


 


Glycerin  1 each  11/18/19 15:18  11/18/19 18:08





  Glycerin Suppository Adult -  IA   1 each





  DAILY PRN   Administration





  CONSTIPATION   





     





     





     


 


Heparin Sodium (Porcine)  5,000 unit  11/15/19 06:00  11/22/19 14:06





  Heparin -  SQ   5,000 unit





  TID KARLA   Administration





     





     





     





     


 


Pantoprazole Sodium  40 mg  11/19/19 10:00  11/22/19 13:27





  Protonix -  PO   40 mg





  DAILY KARLA   Administration





     





     





     





     


 


Paroxetine HCl  30 mg  11/19/19 10:00  11/22/19 13:25





  Paxil -  PO   30 mg





  DAILY KARLA   Administration





     





     





     





     


 


Polyethylene Glycol  17 gm  11/19/19 22:00  11/22/19 13:25





  Miralax (For Daily Use) -  PO   17 grams





  BID KARLA   Administration





     





     





     





     











ASSESSMENT/PLAN:


Ms. Toledo is an 87y/o female with HTN and HLD who presents with progressive 

shortness of breath and fatigue. Per daughter, the patient has become 

increasingly dyspneic on exertion over the last several months and especially 

in the last week prior to admission. Pt was found to have severe transaminitis 

and lactic acidosis. Echo showed LVH with EF 20-25% and RV pressure 40-50 with 

severe TR.





#acute HFrEF


Unknown if pt has previous hx of heart failure due to lack of follow up.


-nuclear stress test showed global hypokinesis but no ischemia


-Lasix 40mg IV


-carvedilol 3.125mg BID





#transaminitis possibly 2/2 congestion


continued improvement


-GI following- paroxetine dose lowered


-acute hep A negative (IgG present)


-ID following


-nephrology following


-MRI- no abnormalities of liver, gallstones present in gallbladder but no 

infection


-monitor INR- downtrending as well





#hyponatremia likely 2/2 HF, improving


131


-water restriction





#constipation


-bowel regimen 





#HTN


-carvedilol





DVT Ppx


heparin





FEN


fluid restriction


monitor Na


Na restricted diet








Visit type





- Emergency Visit


Emergency Visit: Yes


ED Registration Date: 11/14/19


Care time: The patient presented to the Emergency Department on the above date 

and was hospitalized for further evaluation of their emergent condition.





- New Patient


This patient is new to me today: No





- Critical Care


Critical Care patient: No





- Discharge Referral


Referred to [facility] No





ATTENDING PHYSICIAN STATEMENT





I saw and evaluated the patient.


I reviewed the resident's note and discussed the case with the resident.


I agree with the resident's findings and plan as documented.








SUBJECTIVE:








OBJECTIVE:








ASSESSMENT AND PLAN:

## 2019-11-22 NOTE — PN
Progress Note, Physician


History of Present Illness: 





Pt seen and examined at bedside. She denies shortness of breath. 





- Current Medication List


Current Medications: 


Active Medications





Benzocaine/Menthol (Cepacol Lozenge -)  1 each MM PRN PRN


   PRN Reason: SORE THROAT


   Last Admin: 11/20/19 22:06 Dose:  1 each


Carvedilol (Coreg -)  3.125 mg PO BID [facility]


   Last Admin: 11/22/19 13:24 Dose:  3.125 mg


Furosemide (Lasix Injection -)  40 mg IVPUSH DAILY [facility]


   Last Admin: 11/22/19 13:24 Dose:  40 mg


Glycerin (Glycerin Suppository Adult -)  1 each DC DAILY PRN


   PRN Reason: CONSTIPATION


   Last Admin: 11/18/19 18:08 Dose:  1 each


Heparin Sodium (Porcine) (Heparin -)  5,000 unit SQ TID [facility]


   Last Admin: 11/22/19 14:06 Dose:  5,000 unit


Pantoprazole Sodium (Protonix -)  40 mg PO DAILY [facility]


   Last Admin: 11/22/19 13:27 Dose:  40 mg


Paroxetine HCl (Paxil -)  30 mg PO DAILY [facility]


   Last Admin: 11/22/19 13:25 Dose:  30 mg


Polyethylene Glycol (Miralax (For Daily Use) -)  17 gm PO BID [facility]


   Last Admin: 11/22/19 13:25 Dose:  17 grams











- Objective


Vital Signs: 


 Vital Signs











Temperature  98.1 F   11/22/19 06:00


 


Pulse Rate  70   11/22/19 06:00


 


Respiratory Rate  18   11/22/19 08:54


 


Blood Pressure  131/72   11/22/19 06:00


 


O2 Sat by Pulse Oximetry (%)  100   11/22/19 08:54











Constitutional: Yes: Calm


Eyes: Yes: Conjunctiva Clear


HENT: Yes: Atraumatic


Neck: Yes: Supple


Cardiovascular: Yes: S1, S2


Respiratory: Yes: CTA Bilaterally


Gastrointestinal: Yes: Soft


Genitourinary: Yes: WNL


Musculoskeletal: Yes: WNL


Edema: Yes


Edema: LLE: Trace, RLE: Trace


Neurological: Yes: Oriented


Psychiatric: Yes: Oriented


Labs: 


 CBC, BMP





 11/22/19 06:15 





 11/22/19 06:15 





 INR, PTT











INR  1.26  (0.83-1.09)  H  11/22/19  08:27    














Problem List





- Problems


(1) Hyponatremia


Code(s): E87.1 - HYPO-OSMOLALITY AND HYPONATREMIA   





(2) Abnormal liver enzymes


Code(s): R74.8 - ABNORMAL LEVELS OF OTHER SERUM ENZYMES   





(3) New onset of congestive heart failure


Code(s): I50.9 - HEART FAILURE, UNSPECIFIED   





Assessment/Plan


 Current Medications











Generic Name Dose Route Start Last Admin





  Trade Name Freq  PRN Reason Stop Dose Admin


 


Benzocaine/Menthol  1 each  11/20/19 20:15  11/20/19 22:06





  Cepacol Lozenge -  MM   1 each





  PRN PRN   Administration





  SORE THROAT   





     





     





     


 


Carvedilol  3.125 mg  11/15/19 22:00  11/22/19 13:24





  Coreg -  PO   3.125 mg





  BID KARLA   Administration





     





     





     





     


 


Furosemide  40 mg  11/15/19 16:00  11/22/19 13:24





  Lasix Injection -  IVPUSH   40 mg





  DAILY KARLA   Administration





     





     





     





     


 


Glycerin  1 each  11/18/19 15:18  11/18/19 18:08





  Glycerin Suppository Adult -  DC   1 each





  DAILY PRN   Administration





  CONSTIPATION   





     





     





     


 


Heparin Sodium (Porcine)  5,000 unit  11/15/19 06:00  11/22/19 14:06





  Heparin -  SQ   5,000 unit





  TID KARLA   Administration





     





     





     





     


 


Pantoprazole Sodium  40 mg  11/19/19 10:00  11/22/19 13:27





  Protonix -  PO   40 mg





  DAILY KARLA   Administration





     





     





     





     


 


Paroxetine HCl  30 mg  11/19/19 10:00  11/22/19 13:25





  Paxil -  PO   30 mg





  DAILY KARLA   Administration





     





     





     





     


 


Polyethylene Glycol  17 gm  11/19/19 22:00  11/22/19 13:25





  Miralax (For Daily Use) -  PO   17 grams





  BID KARLA   Administration





     





     





     





     

















Impression


1. hyponatremia


2. chf


3. transaminitis


4. hld





Plan


- cnt lasix IV


- will give one more sodium of sodium


- repeat labs in am


- sodium improving


- restrict free water


- discussed with cardio


- etiology of hyponatremia likely chf 


- will follow

## 2019-11-22 NOTE — PN
Progress Note (short form)





- Note


Progress Note: 


s: no cp palps dizzy; sob better, walked entire hallway yesterday





  


 Vital Signs











 Period  Temp  Pulse  Resp  BP Sys/Reyes  Pulse Ox


 


 Last 24 Hr  98.0 F-98.2 F  66-75  18-20  121-131/62-72  100-100








nad no jvd


rrr s1s2 no mrg


cta bl nl ef


aao3


trace le edema bl


abd nt nd pos bs


no jaundice diaphoresis








  Current Medications











Generic Name Dose Route Start Last Admin





  Trade Name Freq  PRN Reason Stop Dose Admin


 


Benzocaine/Menthol  1 each  11/20/19 20:15  11/20/19 22:06





  Cepacol Lozenge -  MM   1 each





  PRN PRN   Administration





  SORE THROAT   





     





     





     


 


Carvedilol  3.125 mg  11/15/19 22:00  11/22/19 13:24





  Coreg -  PO   3.125 mg





  BID KARLA   Administration





     





     





     





     


 


Furosemide  40 mg  11/15/19 16:00  11/22/19 13:24





  Lasix Injection -  IVPUSH   40 mg





  DAILY KARLA   Administration





     





     





     





     


 


Glycerin  1 each  11/18/19 15:18  11/18/19 18:08





  Glycerin Suppository Adult -  [facility]   1 each





  DAILY PRN   Administration





  CONSTIPATION   





     





     





     


 


Heparin Sodium (Porcine)  5,000 unit  11/15/19 06:00  11/22/19 14:06





  Heparin -  SQ   5,000 unit





  TID KRALA   Administration





     





     





     





     


 


Pantoprazole Sodium  40 mg  11/19/19 10:00  11/22/19 13:27





  Protonix -  PO   40 mg





  DAILY KARLA   Administration





     





     





     





     


 


Paroxetine HCl  30 mg  11/19/19 10:00  11/22/19 13:25





  Paxil -  PO   30 mg





  DAILY KARLA   Administration





     





     





     





     


 


Polyethylene Glycol  17 gm  11/19/19 22:00  11/22/19 13:25





  Miralax (For Daily Use) -  PO   17 grams





  BID KARLA   Administration





     





     





     





     








 Laboratory Last Values











WBC  11.3 K/mm3 (4.0-10.0)  H  11/22/19  06:15    


 


RBC  4.64 M/mm3 (3.60-5.2)   11/22/19  06:15    


 


Hgb  11.9 GM/dL (10.7-15.3)   11/22/19  06:15    


 


Hct  37.7 % (32.4-45.2)   11/22/19  06:15    


 


MCV  81.2 fl (80-96)   11/22/19  06:15    


 


MCH  25.7 pg (25.7-33.7)   11/22/19  06:15    


 


MCHC  31.7 g/dl (32.0-36.0)  L  11/22/19  06:15    


 


RDW  15.1 % (11.6-15.6)   11/22/19  06:15    


 


Plt Count  332 K/MM3 (134-434)   11/22/19  06:15    


 


MPV  8.0 fl (7.5-11.1)   11/22/19  06:15    


 


Absolute Neuts (auto)  11.4 K/mm3 (1.5-8.0)  H  11/18/19  06:40    


 


Neutrophils %  77.7 % (42.8-82.8)   11/18/19  06:40    


 


Lymphocytes %  12.0 % (8-40)   11/18/19  06:40    


 


Monocytes %  9.8 % (3.8-10.2)   11/18/19  06:40    


 


Eosinophils %  0.1 % (0-4.5)   11/18/19  06:40    


 


Basophils %  0.4 % (0-2.0)   11/18/19  06:40    


 


Nucleated RBC %  1 % (0-0)  H  11/18/19  06:40    


 


PT with INR  14.90 SEC (9.7-13.0)  H  11/22/19  08:27    


 


INR  1.26  (0.83-1.09)  H  11/22/19  08:27    


 


PTT (Actin FS)  27.2 SECONDS (25.2-36.5)   11/14/19  14:39    


 


Sodium  131 mmol/L (136-145)  L  11/22/19  06:15    


 


Potassium  4.7 mmol/L (3.5-5.1)   11/22/19  06:15    


 


Chloride  91 mmol/L ()  L  11/22/19  06:15    


 


Carbon Dioxide  30 mmol/L (21-32)   11/22/19  06:15    


 


Anion Gap  9 MMOL/L (8-16)   11/22/19  06:15    


 


BUN  34.1 mg/dL (7-18)  H  11/22/19  06:15    


 


Creatinine  1.2 mg/dL (0.55-1.3)   11/22/19  06:15    


 


Est GFR (CKD-EPI)AfAm  46.73   11/22/19  06:15    


 


Est GFR (CKD-EPI)NonAf  40.32   11/22/19  06:15    


 


Random Glucose  235 mg/dL ()  H  11/22/19  06:15    


 


Serum Osmolality  284 mosm/kg (278-305)   11/19/19  06:15    


 


Lactic Acid  4.4 mmol/L (0.4-2.0)  H*  11/18/19  14:40    


 


Calcium  8.6 mg/dL (8.5-10.1)   11/22/19  06:15    


 


Phosphorus  2.8 mg/dL (2.5-4.9)   11/17/19  06:20    


 


Magnesium  1.9 mg/dL (1.8-2.4)   11/17/19  06:20    


 


Iron  22 ug/dL ()  L  11/16/19  06:20    


 


TIBC  498 ug/dL (250-450)  H  11/16/19  06:20    


 


Iron Saturation  4 % (17.5-39)  L  11/16/19  06:20    


 


Unsaturated IBC  476 ug/dL (200-275)  H  11/16/19  06:20    


 


Ferritin  60.5 ng/ml (8-388)   11/16/19  06:20    


 


Total Bilirubin  1.6 mg/dL (0.2-1)  H  11/22/19  06:15    


 


Direct Bilirubin  0.9 mg/dL (0.0-0.2)  H  11/22/19  06:15    


 


AST  186 U/L (15-37)  H  11/22/19  06:15    


 


ALT  611 U/L (13-61)  H  11/22/19  06:15    


 


Alkaline Phosphatase  104 U/L ()   11/22/19  06:15    


 


Ammonia  13.20 umol/L (11-32)   11/19/19  06:15    


 


LD Total  881 IU/L (119-226)  H  11/16/19  06:20    


 


Creatine Kinase  158 U/L ()   11/14/19  14:20    


 


Creatine Kinase Index  1.5 % (0.0-5.0)   11/14/19  14:20    


 


CK-MB (CK-2)  2.5 ng/mL (0.5-3.6)   11/14/19  14:20    


 


Troponin I  0.04 ng/ml (0.00-0.05)   11/14/19  14:20    


 


B-Natriuretic Peptide  6499.0 pg/ml (5-450)  H  11/14/19  12:50    


 


Total Protein  5.8 g/dl (6.4-8.2)  L  11/22/19  06:15    


 


Albumin  2.7 g/dl (3.4-5.0)  L  11/22/19  06:15    


 


LDL 1 Fraction  7.0 IU/L (17-32)  L  11/16/19  06:20    


 


LDL 2 Fraction  13.0 IU/L (25-40)  L  11/16/19  06:20    


 


LDL 3 Fraction  11.0 IU/L (17-27)  L  11/16/19  06:20    


 


LDL 4 Fraction  8.0 IU/L (5-13)   11/16/19  06:20    


 


LDL 5 Fraction  61.0 IU/L (4-20)  H  11/16/19  06:20    


 


Lipase  73 U/L ()   11/14/19  14:20    


 


Tumor Marker AFP  2.9 ng/ml (0.0-8.3)   11/16/19  06:20    


 


TSH  1.86 uIU/ml (0.358-3.74)   11/19/19  06:15    


 


Cortisol AM Sample  33.7 ug/dL (6.2-19.4)  H  11/19/19  06:15    


 


Urine Color  Dk yellow   11/14/19  12:50    


 


Urine Appearance  Clear   11/14/19  12:50    


 


Urine pH  5.0  (5.0-8.0)   11/14/19  12:50    


 


Ur Specific Gravity  1.026  (1.010-1.035)   11/14/19  12:50    


 


Urine Protein  2+  (NEGATIVE)  H  11/14/19  12:50    


 


Urine Glucose (UA)  3+  (NEGATIVE)  H  11/14/19  12:50    


 


Urine Ketones  Trace  (NEGATIVE)  H  11/14/19  12:50    


 


Urine Blood  Negative  (NEGATIVE)   11/14/19  12:50    


 


Urine Nitrite  Negative  (NEGATIVE)   11/14/19  12:50    


 


Urine Bilirubin  Negative  (NEGATIVE)   11/14/19  12:50    


 


Urine Urobilinogen  1.0 mg/dL (0.2-1.0)   11/14/19  12:50    


 


Ur Leukocyte Esterase  Negative  (NEGATIVE)   11/14/19  12:50    


 


Urine WBC (Auto)  2 /hpf (0-5)   11/14/19  12:50    


 


Urine RBC (Auto)  2 /hpf (0-4)   11/14/19  12:50    


 


Urine Casts (Auto)  6 /lpf (0-8)   11/14/19  12:50    


 


U Epithel Cells (Auto)  5.7 /HPF (0-5/HPF)   11/14/19  12:50    


 


Urine Bacteria (Auto)  4.3 /hpf (NEGATIVE)   11/14/19  12:50    


 


Urine Osmolality  391 mosm/kg (300-900)   11/18/19  16:00    


 


Ur Random Sodium  11 MMOL/L ()  L  11/18/19  16:00    


 


Ur Random Potassium  16.0 MMOL/L ()  L  11/18/19  16:00    


 


Ur Random Chloride  < 11 MMOL/L (110-250)  L  11/18/19  16:00    


 


Opiates Screen  Negative ng/ml (JYFOZY=939)   11/15/19  11:28    


 


Methadone Screen  Negative ng/ml (UOJYAL=438)   11/15/19  11:28    


 


Acetaminophen  <2.0   11/14/19  14:10    


 


Barbiturate Screen  Negative ng/ml (MHCDOU=599)   11/15/19  11:28    


 


Phencyclidine Screen  Negative ng/ml (CUTOFF=25)   11/15/19  11:28    


 


Ur Amphetamines Screen  Negative ng/ml (BWOVES=403)   11/15/19  11:28    


 


MDMA (Ecstasy) Screen  Negative ng/ml (TDLZIE=928)   11/15/19  11:28    


 


Benzodiazepines Screen  Negative ng/ml (YQASUO=857)   11/15/19  11:28    


 


Cocaine Screen  Negative ng/ml (AKGNPJ=815)   11/15/19  11:28    


 


U Marijuana (THC) Screen  Negative ng/ml (CUTOFF=50)   11/15/19  11:28    


 


Alcohol, Quantitative  < 3.0 mg/dL (0.0-5.0)   11/15/19  07:00    


 


HARISH Screen  Negative  (.)   11/15/19  07:00    


 


Smooth Musc &RNP Intrp  10 Units (0-19)   11/15/19  07:00    


 


Adenovirus (PCR)  Negative  (Negative)   11/17/19  17:00    


 


CMV IgG Ab  > 10.00 U/mL (0.00-0.59)  H  11/15/19  11:45    


 


CMV IgM Ab  < 30.0 AU/mL (0.0-29.9)   11/15/19  11:45    


 


EBV IgG Ab  >600.0 U/mL (0.0-17.9)  H  11/15/19  11:45    


 


EBV IgM Ab  <36.0 U/mL (0.0-35.9)   11/15/19  11:45    


 


EBV Nuclear Antigen  533.0 U/mL (0.0-17.9)  H  11/15/19  11:45    


 


Hep A IgM Ab Confirm  Indeterminate  (Negative)  H  11/15/19  07:00    


 


Hepatitis A Ab Total  Positive  (Negative)  H  11/15/19  07:00    


 


Hep Bs Antigen  Negative  (Negative)   11/15/19  07:00    


 


Hep Bs Antibody  Non reactive  (.)   11/15/19  07:00    


 


Hep B Core Total Ab  Negative  (Negative)   11/15/19  07:00    


 


Hep B Core IgM Ab  Negative  (Negative)   11/15/19  07:00    


 


Hepatitis Be Antibody  Negative  (Negative)   11/15/19  07:00    


 


Hepatitis Be Antigen  Negative  (Negative)   11/15/19  07:00    


 


Hepatitis C Ab (EIA)  0.1 s/co ratio (0.0-0.9)   11/14/19  14:39    


 


Human Metapneumovir PCR  Negative  (Negative)   11/17/19  17:00    


 


Influenza A (H1) PCR  Negative  (Negative)   11/17/19  17:00    


 


Influenza B (RT-PCR)  Negative  (Negative)   11/17/19  17:00    


 


Parainfluenza 1 (PCR)  Negative  (Negative)   11/17/19  17:00    


 


Parainfluenza 2 (PCR)  Negative  (Negative)   11/17/19  17:00    


 


Parainfluenza 3 (PCR)  Negative  (Negative)   11/17/19  17:00    


 


RSV Type A (PCR)  Negative  (Negative)   11/17/19  17:00    


 


RSV Type B (PCR)  Negative  (Negative)   11/17/19  17:00    


 


Rhinovirus (PCR)  Negative  (Negative)   11/17/19  17:00    











ecg: sr lbbb





cxr: clear lungs





echo 11/2019:  lvef 20-25, global hk, nl rv, sev tr, mild lae, mod mr, rvsp 40-

50





mibi 11/2019: no ischemia








a/p:  88 f hx hld, ckd (bl 1.2), here with sob.








sob, acute systolic chf:


-no known hx hrt dz but echo here showing reduced lvef and sev tr


-seems that pt has component of vol overload with hepatic congestion causing 

elevated lfts (also possible from hep A)


-has been getting lasix 40 iv qd with improvement in cr and sob, LFTs coming 

down as well, edema improving


-monitor daily wts, lytes


-cont current lasix


-started coreg for chf regimen.  hold off on ace/arb until cr/lytes more stable


-mibi for ischemic evaluation was done here and showed no ischemia





hld:


-statin held 2/2 elevated lfts





yane on ckd:


-monitor cr with diuresis, possibly cardiorenal as cr improved with lasix








abnl ecg:


-lbbb likely due to sev dec lvef


-mibi shows no ischemia

## 2019-11-22 NOTE — PN
Teaching Attending Note


Name of Resident: Layla Ly





ATTENDING PHYSICIAN STATEMENT





I saw and evaluated the patient.


I reviewed the resident's note and discussed the case with the resident.


I agree with the resident's findings and plan as documented.








SUBJECTIVE:


Patient is comfortable with no acute distress, no shortness of breath 


 Vital Signs











Temperature  97.8 F   11/22/19 18:48


 


Pulse Rate  71   11/22/19 18:48


 


Respiratory Rate  18   11/22/19 18:48


 


Blood Pressure  115/60   11/22/19 18:48


 


O2 Sat by Pulse Oximetry (%)  100   11/22/19 08:54











GENERAL: The patient is awake, alert, and fully oriented, in no acute distress.


HEAD: Normal with no signs of trauma.


EYES: PERRL, extraocular movements intact, sclera anicteric, conjunctiva clear.

  


ENT: Ears normal,  oropharynx clear without exudates, moist mucous membranes.


NECK: Trachea midline, full range of motion, supple. 


LUNGS: decreased Breath sounds bl , no wheezes, no crackles, no accessory 

muscle use. 


HEART: Regular rate and rhythm, S1, S2 without murmur, rub or gallop.


ABDOMEN: Soft, NT, ND, normoactive bowel sounds, no guarding, no rebound, no 

hepatosplenomegaly, no masses.


EXTREMITIES: 2+ pulses, warm, well-perfused, no edema. 


NEUROLOGICAL: Cranial nerves II through XII grossly intact. Normal speech, gait 

not observed.


PSYCH: Normal mood, normal affect.


SKIN: Warm, dry, normal turgor, no rashes or lesions noted





  CBCD











WBC  11.3 K/mm3 (4.0-10.0)  H  11/22/19  06:15    


 


RBC  4.64 M/mm3 (3.60-5.2)   11/22/19  06:15    


 


Hgb  11.9 GM/dL (10.7-15.3)   11/22/19  06:15    


 


Hct  37.7 % (32.4-45.2)   11/22/19  06:15    


 


MCV  81.2 fl (80-96)   11/22/19  06:15    


 


MCHC  31.7 g/dl (32.0-36.0)  L  11/22/19  06:15    


 


RDW  15.1 % (11.6-15.6)   11/22/19  06:15    


 


Plt Count  332 K/MM3 (134-434)   11/22/19  06:15    


 


MPV  8.0 fl (7.5-11.1)   11/22/19  06:15    








 CMP











Sodium  131 mmol/L (136-145)  L  11/22/19  06:15    


 


Potassium  4.7 mmol/L (3.5-5.1)   11/22/19  06:15    


 


Chloride  91 mmol/L ()  L  11/22/19  06:15    


 


Carbon Dioxide  30 mmol/L (21-32)   11/22/19  06:15    


 


Anion Gap  9 MMOL/L (8-16)   11/22/19  06:15    


 


BUN  34.1 mg/dL (7-18)  H  11/22/19  06:15    


 


Creatinine  1.2 mg/dL (0.55-1.3)   11/22/19  06:15    


 


Random Glucose  235 mg/dL ()  H  11/22/19  06:15    


 


Calcium  8.6 mg/dL (8.5-10.1)   11/22/19  06:15    


 


Total Bilirubin  1.6 mg/dL (0.2-1)  H  11/22/19  06:15    


 


AST  186 U/L (15-37)  H  11/22/19  06:15    


 


ALT  611 U/L (13-61)  H  11/22/19  06:15    


 


Alkaline Phosphatase  104 U/L ()   11/22/19  06:15    


 


Total Protein  5.8 g/dl (6.4-8.2)  L  11/22/19  06:15    


 


Albumin  2.7 g/dl (3.4-5.0)  L  11/22/19  06:15    








 CARDIAC ENZYMES











Creatine Kinase  158 U/L ()   11/14/19  14:20    


 


Troponin I  0.04 ng/ml (0.00-0.05)   11/14/19  14:20    








 Current Medications











Generic Name Dose Route Start Last Admin





  Trade Name Freq  PRN Reason Stop Dose Admin


 


Benzocaine/Menthol  1 each  11/20/19 20:15  11/20/19 22:06





  Cepacol Lozenge -  MM   1 each





  PRN PRN   Administration





  SORE THROAT   





     





     





     


 


Carvedilol  3.125 mg  11/15/19 22:00  11/22/19 13:24





  Coreg -  PO   3.125 mg





  BID KARLA   Administration





     





     





     





     


 


Furosemide  40 mg  11/15/19 16:00  11/22/19 13:24





  Lasix Injection -  IVPUSH   40 mg





  DAILY KARLA   Administration





     





     





     





     


 


Glycerin  1 each  11/18/19 15:18  11/18/19 18:08





  Glycerin Suppository Adult -  NE   1 each





  DAILY PRN   Administration





  CONSTIPATION   





     





     





     


 


Heparin Sodium (Porcine)  5,000 unit  11/15/19 06:00  11/22/19 14:06





  Heparin -  SQ   5,000 unit





  TID KARLA   Administration





     





     





     





     


 


Pantoprazole Sodium  40 mg  11/19/19 10:00  11/22/19 13:27





  Protonix -  PO   40 mg





  DAILY KARLA   Administration





     





     





     





     


 


Paroxetine HCl  30 mg  11/19/19 10:00  11/22/19 13:25





  Paxil -  PO   30 mg





  DAILY KARLA   Administration





     





     





     





     


 


Polyethylene Glycol  17 gm  11/19/19 22:00  11/22/19 13:25





  Miralax (For Daily Use) -  PO   17 grams





  BID KARLA   Administration





     





     





     





     








home Medications











 Medication  Instructions  Recorded


 


Amoxicillin - [Amoxicillin 500mg 500 mg PO TID 11/14/19





Capsule -]  


 


Aspirin [Children's Aspirin] 81 mg PO DAILY 11/14/19


 


Fenofibrate Nanocrystallized 145 mg PO DAILY 11/14/19





[Fenofibrate]  


 


Losartan Potassium 50 mg PO DAILY 11/14/19


 


Lovastatin 20 mg PO DAILY 11/14/19


 


Paroxetine HCl 40 mg PO DAILY 11/14/19


 


Zolpidem Tartrate 10 mg PO DAILY 11/14/19

















ASSESSMENT AND PLAN:


Patient is an 89yo female with Pmhx of HTN, HLP, Hysterectomy who presented 

with fever, chills x 1 -2 weeks and was found to have elevated LFts. 





# New onset acute systolic heart failure: with severely reduced EF. on lasix 

and coreg continue, cardio on the case ,  possible  life vest before dc given 

her EF is 20-25%,  will need R/L heart cath at some point continue to  hold ACE/

ARB for now. 





# Acute transaminitis: due to congestive hepatopathy will trend --> improving ; 

cont supportive measures 


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 d/w ID , will  cont to hold statin and fibrates, 

cont Paroxitine at a lower dose 30. 





# MASSIEL: probably prerenal azotemia ( cardio-renal sx ) , improving,  cont 

diuresis , cont to hold losartan . if renal function remains stable, then might 

start a  low dose losartan





# Acute hyponatremia: sodium of 131  





# H/o HTN: hold losratan . cont coreg .monitor BP 





DVT PX : heparin SQ

## 2019-11-23 LAB
ALBUMIN SERPL-MCNC: 2.6 G/DL (ref 3.4–5)
ALP SERPL-CCNC: 96 U/L (ref 45–117)
ALT SERPL-CCNC: 437 U/L (ref 13–61)
ANION GAP SERPL CALC-SCNC: 7 MMOL/L (ref 8–16)
AST SERPL-CCNC: 105 U/L (ref 15–37)
BILIRUB SERPL-MCNC: 1.5 MG/DL (ref 0.2–1)
BUN SERPL-MCNC: 31.9 MG/DL (ref 7–18)
CALCIUM SERPL-MCNC: 8.3 MG/DL (ref 8.5–10.1)
CHLORIDE SERPL-SCNC: 94 MMOL/L (ref 98–107)
CO2 SERPL-SCNC: 30 MMOL/L (ref 21–32)
CREAT SERPL-MCNC: 1 MG/DL (ref 0.55–1.3)
DEPRECATED RDW RBC AUTO: 15 % (ref 11.6–15.6)
GLUCOSE SERPL-MCNC: 226 MG/DL (ref 74–106)
HCT VFR BLD CALC: 36.7 % (ref 32.4–45.2)
HGB BLD-MCNC: 11.9 GM/DL (ref 10.7–15.3)
INR BLD: 1.2 (ref 0.83–1.09)
MAGNESIUM SERPL-MCNC: 2.1 MG/DL (ref 1.8–2.4)
MCH RBC QN AUTO: 26.3 PG (ref 25.7–33.7)
MCHC RBC AUTO-ENTMCNC: 32.4 G/DL (ref 32–36)
MCV RBC: 81.2 FL (ref 80–96)
PHOSPHATE SERPL-MCNC: 2 MG/DL (ref 2.5–4.9)
PLATELET # BLD AUTO: 335 K/MM3 (ref 134–434)
PMV BLD: 8.1 FL (ref 7.5–11.1)
POTASSIUM SERPLBLD-SCNC: 4 MMOL/L (ref 3.5–5.1)
PROT SERPL-MCNC: 5.6 G/DL (ref 6.4–8.2)
PT PNL PPP: 14.2 SEC (ref 9.7–13)
RBC # BLD AUTO: 4.52 M/MM3 (ref 3.6–5.2)
SODIUM SERPL-SCNC: 131 MMOL/L (ref 136–145)
WBC # BLD AUTO: 10.2 K/MM3 (ref 4–10)

## 2019-11-23 RX ADMIN — POLYETHYLENE GLYCOL 3350 SCH GRAMS: 17 POWDER, FOR SOLUTION ORAL at 21:36

## 2019-11-23 RX ADMIN — CARVEDILOL SCH MG: 3.12 TABLET, FILM COATED ORAL at 21:37

## 2019-11-23 RX ADMIN — POLYETHYLENE GLYCOL 3350 SCH GRAMS: 17 POWDER, FOR SOLUTION ORAL at 10:49

## 2019-11-23 RX ADMIN — FUROSEMIDE SCH MG: 10 INJECTION, SOLUTION INTRAVENOUS at 10:48

## 2019-11-23 RX ADMIN — HEPARIN SODIUM SCH UNIT: 5000 INJECTION, SOLUTION INTRAVENOUS; SUBCUTANEOUS at 06:16

## 2019-11-23 RX ADMIN — HEPARIN SODIUM SCH: 5000 INJECTION, SOLUTION INTRAVENOUS; SUBCUTANEOUS at 14:43

## 2019-11-23 RX ADMIN — HEPARIN SODIUM SCH UNIT: 5000 INJECTION, SOLUTION INTRAVENOUS; SUBCUTANEOUS at 21:36

## 2019-11-23 RX ADMIN — PANTOPRAZOLE SODIUM SCH MG: 40 TABLET, DELAYED RELEASE ORAL at 10:48

## 2019-11-23 RX ADMIN — CARVEDILOL SCH MG: 3.12 TABLET, FILM COATED ORAL at 10:48

## 2019-11-23 NOTE — PN
Progress Note (short form)





- Note


Progress Note: 








s: no cp palps dizzy, sob





  Vital Signs











 Period  Temp  Pulse  Resp  BP Sys/Reyes  Pulse Ox


 


 Last 24 Hr  97.8 F-98.2 F  69-73  16-18  115-126/60-67  100-100











nad no jvd


rrr s1s2 no mrg


cta bl nl ef


aao3


trace le edema bl


abd nt nd pos bs


no jaundice diaphoresis





 Current Medications





Benzocaine/Menthol (Cepacol Lozenge -)  1 each MM PRN PRN


   PRN Reason: SORE THROAT


   Last Admin: 11/20/19 22:06 Dose:  1 each


Carvedilol (Coreg -)  3.125 mg PO BID [facility]


   Last Admin: 11/23/19 10:48 Dose:  3.125 mg


Furosemide (Lasix Injection -)  40 mg IVPUSH DAILY [facility]


   Last Admin: 11/23/19 10:48 Dose:  40 mg


Glycerin (Glycerin Suppository Adult -)  1 each AL DAILY PRN


   PRN Reason: CONSTIPATION


   Last Admin: 11/18/19 18:08 Dose:  1 each


Heparin Sodium (Porcine) (Heparin -)  5,000 unit SQ TID [facility]


   Last Admin: 11/23/19 06:16 Dose:  5,000 unit


Pantoprazole Sodium (Protonix -)  40 mg PO DAILY [facility]


   Last Admin: 11/23/19 10:48 Dose:  40 mg


Paroxetine HCl (Paxil -)  30 mg PO DAILY [facility]


   Last Admin: 11/23/19 10:48 Dose:  30 mg


Polyethylene Glycol (Miralax (For Daily Use) -)  17 gm PO BID [facility]


   Last Admin: 11/23/19 10:49 Dose:  17 grams











ecg: sr lbbb





cxr: clear lungs





echo 11/2019:  lvef 20-25, global hk, nl rv, sev tr, mild lae, mod mr, rvsp 40-

50





mibi 11/2019: no ischemia








a/p:  88 f hx hld, ckd (bl 1.2), here with sob.








sob, acute systolic chf:


-no known hx hrt dz but echo here showing reduced lvef and sev tr


-seems that pt has component of vol overload with hepatic congestion causing 

elevated lfts (also possible from hep A)


-has been getting lasix 40 iv qd with improvement in cr and sob, LFTs coming 

down as well, edema improving


-monitor daily wts, lytes


-cont current lasix for now


-started coreg for chf regimen.  hold off on ace/arb until cr/lytes more stable


-mibi for ischemic evaluation was done here and showed no ischemia





hld:


-statin held 2/2 elevated lfts





yane on ckd:


-monitor cr with diuresis, possibly cardiorenal as cr improved with lasix








abnl ecg:


-lbbb likely due to sev dec lvef


-mibi shows no ischemia

## 2019-11-23 NOTE — PN
Progress Note (short form)





- Note


Progress Note: 








1. hyponatremia


2. chf


3. transaminitis


4. hld








Active Medications





Benzocaine/Menthol (Cepacol Lozenge -)  1 each MM PRN PRN


   PRN Reason: SORE THROAT


   Last Admin: 11/20/19 22:06 Dose:  1 each


Carvedilol (Coreg -)  3.125 mg PO BID [facility]


   Last Admin: 11/23/19 10:48 Dose:  3.125 mg


Furosemide (Lasix Injection -)  40 mg IVPUSH DAILY [facility]


   Last Admin: 11/23/19 10:48 Dose:  40 mg


Glycerin (Glycerin Suppository Adult -)  1 each ID DAILY PRN


   PRN Reason: CONSTIPATION


   Last Admin: 11/18/19 18:08 Dose:  1 each


Heparin Sodium (Porcine) (Heparin -)  5,000 unit SQ TID [facility]


   Last Admin: 11/23/19 06:16 Dose:  5,000 unit


Pantoprazole Sodium (Protonix -)  40 mg PO DAILY [facility]


   Last Admin: 11/23/19 10:48 Dose:  40 mg


Paroxetine HCl (Paxil -)  30 mg PO DAILY [facility]


   Last Admin: 11/23/19 10:48 Dose:  30 mg


Polyethylene Glycol (Miralax (For Daily Use) -)  17 gm PO BID [facility]


   Last Admin: 11/23/19 10:49 Dose:  17 grams





 Last Vital Signs











Temp Pulse Resp BP Pulse Ox


 


 97.5 F L  73   20   128/60   100 


 


 11/23/19 14:32  11/23/19 14:32  11/23/19 14:32  11/23/19 14:32  11/23/19 09:00























 CBC, BMP





 11/23/19 07:54 





 11/23/19 07:54 











IMP- Hyponatremia


CHF/Lasix











Plan


- cnt lasix IV


- will give one more sodium of sodium


- repeat labs in am


- sodium improving


- restrict free water


- discussed with cardio


- etiology of hyponatremia likely chf 


- will follow

## 2019-11-23 NOTE — PN
Progress Note (short form)





- Note


Progress Note: 





Patient is better with no acute distress. No shortness of breath.


 Vital Signs











Temperature  98.1 F   11/23/19 18:30


 


Pulse Rate  70   11/23/19 18:30


 


Respiratory Rate  20   11/23/19 18:30


 


Blood Pressure  123/59 L  11/23/19 18:30


 


O2 Sat by Pulse Oximetry (%)  100   11/23/19 09:00








GENERAL: The patient is awake, alert, and fully oriented, in no acute distress.


HEAD: Normal with no signs of trauma.


EYES: PERRL, extraocular movements intact, sclera anicteric, conjunctiva clear.

  


ENT: Ears normal,  oropharynx clear without exudates, moist mucous membranes.


NECK: Trachea midline, full range of motion, supple. 


LUNGS: Breath sounds equal, clear to auscultation bilaterally, no wheezes, no 

crackles, no accessory muscle use. 


HEART: Regular rate and rhythm, S1, S2 positive, DAVID 2/6, no rub or gallop.


ABDOMEN: Soft, NT,ND, normoactive bowel sounds, no guarding, no rebound, no 

hepatosplenomegaly, no masses.


EXTREMITIES: 2+ pulses, warm, well-perfused, no edema. 


NEUROLOGICAL: Cranial nerves II through XII grossly intact. Normal speech, gait 

not observed.


PSYCH: Normal mood, normal affect.


SKIN: Warm, dry, normal turgor, no rashes or lesions noted


  CBCD











WBC  10.2 K/mm3 (4.0-10.0)  H  11/23/19  07:54    


 


RBC  4.52 M/mm3 (3.60-5.2)   11/23/19  07:54    


 


Hgb  11.9 GM/dL (10.7-15.3)   11/23/19  07:54    


 


Hct  36.7 % (32.4-45.2)   11/23/19  07:54    


 


MCV  81.2 fl (80-96)   11/23/19  07:54    


 


MCHC  32.4 g/dl (32.0-36.0)   11/23/19  07:54    


 


RDW  15.0 % (11.6-15.6)   11/23/19  07:54    


 


Plt Count  335 K/MM3 (134-434)   11/23/19  07:54    


 


MPV  8.1 fl (7.5-11.1)   11/23/19  07:54    








 CMP











Sodium  131 mmol/L (136-145)  L  11/23/19  07:54    


 


Potassium  4.0 mmol/L (3.5-5.1)   11/23/19  07:54    


 


Chloride  94 mmol/L ()  L  11/23/19  07:54    


 


Carbon Dioxide  30 mmol/L (21-32)   11/23/19  07:54    


 


Anion Gap  7 MMOL/L (8-16)  L  11/23/19  07:54    


 


BUN  31.9 mg/dL (7-18)  H  11/23/19  07:54    


 


Creatinine  1.0 mg/dL (0.55-1.3)   11/23/19  07:54    


 


Random Glucose  226 mg/dL ()  H  11/23/19  07:54    


 


Calcium  8.3 mg/dL (8.5-10.1)  L  11/23/19  07:54    


 


Total Bilirubin  1.5 mg/dL (0.2-1)  H  11/23/19  07:54    


 


AST  105 U/L (15-37)  H  11/23/19  07:54    


 


ALT  437 U/L (13-61)  H  11/23/19  07:54    


 


Alkaline Phosphatase  96 U/L ()   11/23/19  07:54    


 


Total Protein  5.6 g/dl (6.4-8.2)  L  11/23/19  07:54    


 


Albumin  2.6 g/dl (3.4-5.0)  L  11/23/19  07:54    








 CARDIAC ENZYMES











Creatine Kinase  158 U/L ()   11/14/19  14:20    


 


Troponin I  0.04 ng/ml (0.00-0.05)   11/14/19  14:20    








 Current Medications











Generic Name Dose Route Start Last Admin





  Trade Name Freq  PRN Reason Stop Dose Admin


 


Benzocaine/Menthol  1 each  11/20/19 20:15  11/20/19 22:06





  Cepacol Lozenge -  MM   1 each





  PRN PRN   Administration





  SORE THROAT   





     





     





     


 


Carvedilol  3.125 mg  11/15/19 22:00  11/23/19 10:48





  Coreg -  PO   3.125 mg





  BID KARLA   Administration





     





     





     





     


 


Furosemide  40 mg  11/15/19 16:00  11/23/19 10:48





  Lasix Injection -  IVPUSH   40 mg





  DAILY KARLA   Administration





     





     





     





     


 


Glycerin  1 each  11/18/19 15:18  11/18/19 18:08





  Glycerin Suppository Adult -  CA   1 each





  DAILY PRN   Administration





  CONSTIPATION   





     





     





     


 


Heparin Sodium (Porcine)  5,000 unit  11/15/19 06:00  11/23/19 06:16





  Heparin -  SQ   5,000 unit





  TID KARLA   Administration





     





     





     





     


 


Pantoprazole Sodium  40 mg  11/19/19 10:00  11/23/19 10:48





  Protonix -  PO   40 mg





  DAILY KARLA   Administration





     





     





     





     


 


Paroxetine HCl  30 mg  11/19/19 10:00  11/23/19 10:48





  Paxil -  PO   30 mg





  DAILY KARLA   Administration





     





     





     





     


 


Polyethylene Glycol  17 gm  11/19/19 22:00  11/23/19 10:49





  Miralax (For Daily Use) -  PO   17 grams





  BID KARLA   Administration





     





     





     





     








 Home Medications











 Medication  Instructions  Recorded


 


Amoxicillin - [Amoxicillin 500mg 500 mg PO TID 11/14/19





Capsule -]  


 


Aspirin [Children's Aspirin] 81 mg PO DAILY 11/14/19


 


Fenofibrate Nanocrystallized 145 mg PO DAILY 11/14/19





[Fenofibrate]  


 


Losartan Potassium 50 mg PO DAILY 11/14/19


 


Lovastatin 20 mg PO DAILY 11/14/19


 


Paroxetine HCl 40 mg PO DAILY 11/14/19


 


Zolpidem Tartrate 10 mg PO DAILY 11/14/19








ASSESSMENT AND PLAN:


Patient is an 89yo female with Pmhx of HTN, HLP, Hysterectomy who presented 

with fever, chills x 1 -2 weeks and was found to have elevated LFts. 





# New onset acute systolic heart failure: with severely reduced EF. on lasix 

and coreg continue, cardio on the case ,possible needing  life vest before dc 

given   her EF is 20-25%, will need R/L heart cath at some point , hold ACE/ARB 

for now. 





# Acute transaminitis: improving due to congestive hepatopathy will continue to 

trend improving ; cont supportive measures 


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 d/w ID , will  cont to hold statin and fibrates, 

cont Paroxitine at a lower dose 30. 





# MASSIEL: probably prerenal azotemia ( cardio-renal sx ) , improving,  cont 

diuresis, cont to hold losartan . if renal function remains stable, then might 

start a  low dose losartan





# Acute hyponatremia: improving 127--> 131 today , will check with nephro for 

sodium tablets x 2 doses.





# H/o HTN: hold losratan. cont coreg .monitor BP 





DVT PX : heparin SQ




















Visit type





- Emergency Visit


Emergency Visit: Yes


ED Registration Date: 11/14/19


Care time: The patient presented to the Emergency Department on the above date 

and was hospitalized for further evaluation of their emergent condition.





- New Patient


This patient is new to me today: No





- Critical Care


Critical Care patient: No





- Discharge Referral


Referred to [facility] No

## 2019-11-24 RX ADMIN — POLYETHYLENE GLYCOL 3350 SCH GRAMS: 17 POWDER, FOR SOLUTION ORAL at 21:31

## 2019-11-24 RX ADMIN — SODIUM CHLORIDE TAB 1 GM SCH GM: 1 TAB at 23:00

## 2019-11-24 RX ADMIN — HEPARIN SODIUM SCH: 5000 INJECTION, SOLUTION INTRAVENOUS; SUBCUTANEOUS at 15:20

## 2019-11-24 RX ADMIN — PANTOPRAZOLE SODIUM SCH MG: 40 TABLET, DELAYED RELEASE ORAL at 11:15

## 2019-11-24 RX ADMIN — HEPARIN SODIUM SCH UNIT: 5000 INJECTION, SOLUTION INTRAVENOUS; SUBCUTANEOUS at 21:30

## 2019-11-24 RX ADMIN — HEPARIN SODIUM SCH UNIT: 5000 INJECTION, SOLUTION INTRAVENOUS; SUBCUTANEOUS at 05:39

## 2019-11-24 RX ADMIN — CARVEDILOL SCH MG: 3.12 TABLET, FILM COATED ORAL at 21:31

## 2019-11-24 RX ADMIN — CARVEDILOL SCH MG: 3.12 TABLET, FILM COATED ORAL at 11:15

## 2019-11-24 RX ADMIN — POLYETHYLENE GLYCOL 3350 SCH: 17 POWDER, FOR SOLUTION ORAL at 11:11

## 2019-11-24 RX ADMIN — FUROSEMIDE SCH MG: 10 INJECTION, SOLUTION INTRAVENOUS at 11:16

## 2019-11-24 NOTE — PN
Teaching Attending Note


Name of Resident: Layla Ly





ATTENDING PHYSICIAN STATEMENT





I saw and evaluated the patient.


I reviewed the resident's note and discussed the case with the resident.


I agree with the resident's findings and plan as documented.








SUBJECTIVE:


Patient is comfortable with no acute distress, feels better.


 Vital Signs











Temperature  98 F   11/24/19 10:00


 


Pulse Rate  79   11/24/19 10:00


 


Respiratory Rate  18   11/24/19 10:00


 


Blood Pressure  132/83   11/24/19 10:00


 


O2 Sat by Pulse Oximetry (%)  100   11/23/19 21:00











GENERAL: The patient is awake, alert, and fully oriented, in no acute distress.


HEAD: Normal with no signs of trauma.


EYES: PERRL, extraocular movements intact, sclera anicteric, conjunctiva clear.

  


ENT: Ears normal,  oropharynx clear without exudates, moist mucous membranes.


NECK: Trachea midline, full range of motion, supple. 


LUNGS: Breath sounds equal, clear to auscultation bilaterally, no wheezes, no 

crackles, no accessory muscle use. 


HEART: Regular rate and rhythm, S1, S2 positive, DAVID 2/6, no rub or gallop.


ABDOMEN: Soft, NT,ND, normoactive bowel sounds, no guarding, no rebound, no 

hepatosplenomegaly, no masses.


EXTREMITIES: 2+ pulses, warm, well-perfused, no edema. 


NEUROLOGICAL: Cranial nerves II through XII grossly intact. Normal speech, gait 

not observed.


PSYCH: Normal mood, normal affect.


SKIN: Warm, dry, normal turgor, no rashes or lesions noted


  CBCD











WBC  10.2 K/mm3 (4.0-10.0)  H  11/23/19  07:54    


 


RBC  4.52 M/mm3 (3.60-5.2)   11/23/19  07:54    


 


Hgb  11.9 GM/dL (10.7-15.3)   11/23/19  07:54    


 


Hct  36.7 % (32.4-45.2)   11/23/19  07:54    


 


MCV  81.2 fl (80-96)   11/23/19  07:54    


 


MCHC  32.4 g/dl (32.0-36.0)   11/23/19  07:54    


 


RDW  15.0 % (11.6-15.6)   11/23/19  07:54    


 


Plt Count  335 K/MM3 (134-434)   11/23/19  07:54    


 


MPV  8.1 fl (7.5-11.1)   11/23/19  07:54    








 CMP











Sodium  131 mmol/L (136-145)  L  11/23/19  07:54    


 


Potassium  4.0 mmol/L (3.5-5.1)   11/23/19  07:54    


 


Chloride  94 mmol/L ()  L  11/23/19  07:54    


 


Carbon Dioxide  30 mmol/L (21-32)   11/23/19  07:54    


 


Anion Gap  7 MMOL/L (8-16)  L  11/23/19  07:54    


 


BUN  31.9 mg/dL (7-18)  H  11/23/19  07:54    


 


Creatinine  1.0 mg/dL (0.55-1.3)   11/23/19  07:54    


 


Random Glucose  226 mg/dL ()  H  11/23/19  07:54    


 


Calcium  8.3 mg/dL (8.5-10.1)  L  11/23/19  07:54    


 


Total Bilirubin  1.5 mg/dL (0.2-1)  H  11/23/19  07:54    


 


AST  105 U/L (15-37)  H  11/23/19  07:54    


 


ALT  437 U/L (13-61)  H  11/23/19  07:54    


 


Alkaline Phosphatase  96 U/L ()   11/23/19  07:54    


 


Total Protein  5.6 g/dl (6.4-8.2)  L  11/23/19  07:54    


 


Albumin  2.6 g/dl (3.4-5.0)  L  11/23/19  07:54    








 CARDIAC ENZYMES











Creatine Kinase  158 U/L ()   11/14/19  14:20    


 


Troponin I  0.04 ng/ml (0.00-0.05)   11/14/19  14:20    








 Current Medications











Generic Name Dose Route Start Last Admin





  Trade Name Freq  PRN Reason Stop Dose Admin


 


Benzocaine/Menthol  1 each  11/20/19 20:15  11/20/19 22:06





  Cepacol Lozenge -  MM   1 each





  PRN PRN   Administration





  SORE THROAT   





     





     





     


 


Carvedilol  3.125 mg  11/15/19 22:00  11/23/19 10:48





  Coreg -  PO   3.125 mg





  BID KARLA   Administration





     





     





     





     


 


Furosemide  40 mg  11/15/19 16:00  11/23/19 10:48





  Lasix Injection -  IVPUSH   40 mg





  DAILY KARLA   Administration





     





     





     





     


 


Glycerin  1 each  11/18/19 15:18  11/18/19 18:08





  Glycerin Suppository Adult -  [place]   1 each





  DAILY PRN   Administration





  CONSTIPATION   





     





     





     


 


Heparin Sodium (Porcine)  5,000 unit  11/15/19 06:00  11/23/19 06:16





  Heparin -  SQ   5,000 unit





  TID KARLA   Administration





     





     





     





     


 


Pantoprazole Sodium  40 mg  11/19/19 10:00  11/23/19 10:48





  Protonix -  PO   40 mg





  DAILY KARLA   Administration





     





     





     





     


 


Paroxetine HCl  30 mg  11/19/19 10:00  11/23/19 10:48





  Paxil -  PO   30 mg





  DAILY KARLA   Administration





     





     





     





     


 


Polyethylene Glycol  17 gm  11/19/19 22:00  11/23/19 10:49





  Miralax (For Daily Use) -  PO   17 grams





  BID KARLA   Administration





     





     





     





     








 Home Medications











 Medication  Instructions  Recorded


 


Amoxicillin - [Amoxicillin 500mg 500 mg PO TID 11/14/19





Capsule -]  


 


Aspirin [Children's Aspirin] 81 mg PO DAILY 11/14/19


 


Fenofibrate Nanocrystallized 145 mg PO DAILY 11/14/19





[Fenofibrate]  


 


Losartan Potassium 50 mg PO DAILY 11/14/19


 


Lovastatin 20 mg PO DAILY 11/14/19


 


Paroxetine HCl 40 mg PO DAILY 11/14/19


 


Zolpidem Tartrate 10 mg PO DAILY 11/14/19








ASSESSMENT AND PLAN:


Patient is an 89yo female with Pmhx of HTN, HLP, Hysterectomy who presented 

with fever, chills x 1 -2 weeks and was found to have elevated LFts. 





# New onset acute systolic heart failure: with severely reduced EF. on lasix 

and coreg continue, cardio on the case ,possible needing  life vest before dc 

given   her EF is 20-25%, will need R/L heart cath at some point , hold ACE/ARB 

for now. 





# Acute transaminitis: improving due to congestive hepatopathy will continue to 

trend improving ; cont supportive measures 


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 d/w ID , will  cont to hold statin and fibrates, 

cont Paroxitine at a lower dose 30. 





# MASSIEL: probably prerenal azotemia ( cardio-renal sx ) , improved , continue 

diuresing,  cont to hold losartan will check with cardio whether to place her 

back to losartan . repeat CMP  in am 





# Acute hyponatremia: improving 127--> 131 today , will check with nephro for 

sodium tablets x 2 doses.





# hypophos: replete





# H/o HTN: hold losratan. cont coreg .monitor BP 





DVT PX : heparin SQ

## 2019-11-24 NOTE — PN
Progress Note (short form)





- Note


Progress Note: 








s: no cp palps dizzy, sob





  


 Vital Signs











 Period  Temp  Pulse  Resp  BP Sys/Reyes  Pulse Ox


 


 Last 24 Hr  97.5 F-98.1 F  70-79  18-20  123-132/59-83  100











nad no jvd


rrr s1s2 no mrg


cta bl nl ef


aao3


trace le edema bl


abd nt nd pos bs


no jaundice diaphoresis





  Current Medications





Benzocaine/Menthol (Cepacol Lozenge -)  1 each MM PRN PRN


   PRN Reason: SORE THROAT


   Last Admin: 11/20/19 22:06 Dose:  1 each


Carvedilol (Coreg -)  3.125 mg PO BID [facility]


   Last Admin: 11/24/19 11:15 Dose:  3.125 mg


Furosemide (Lasix Injection -)  40 mg IVPUSH DAILY [facility]


   Last Admin: 11/24/19 11:16 Dose:  40 mg


Glycerin (Glycerin Suppository Adult -)  1 each [place] DAILY PRN


   PRN Reason: CONSTIPATION


   Last Admin: 11/18/19 18:08 Dose:  1 each


Heparin Sodium (Porcine) (Heparin -)  5,000 unit SQ TID [facility]


   Last Admin: 11/24/19 05:39 Dose:  5,000 unit


Sodium Phosphate 15 mm/ Sodium (Chloride)  255 mls @ 42.5 mls/hr IVPB ONCE ONE


   Stop: 11/24/19 18:59


Pantoprazole Sodium (Protonix -)  40 mg PO DAILY [facility]


   Last Admin: 11/24/19 11:15 Dose:  40 mg


Paroxetine HCl (Paxil -)  30 mg PO DAILY [facility]


   Last Admin: 11/24/19 11:15 Dose:  30 mg


Polyethylene Glycol (Miralax (For Daily Use) -)  17 gm PO BID [facility]


   Last Admin: 11/24/19 11:11 Dose:  Not Given











ecg: sr lbbb





cxr: clear lungs





echo 11/2019:  lvef 20-25, global hk, nl rv, sev tr, mild lae, mod mr, rvsp 40-

50





mibi 11/2019: no ischemia








a/p:  88 f hx hld, ckd (bl 1.2), here with sob.








sob, acute systolic chf:


-no known hx hrt dz but echo here showing reduced lvef and sev tr


-seems that pt has component of vol overload with hepatic congestion causing 

elevated lfts (also possible from hep A)


-has been getting lasix 40 iv qd with improvement in cr and sob, LFTs continue 

to decrease, edema improving


-monitor daily wts, lytes


-cont current lasix 


-cont coreg for chf regimen.  hold off on ace/arb until cr/lytes more stable


-mibi for ischemic evaluation was done here and showed no ischemia





hld:


-statin held 2/2 elevated lfts





yane on ckd:


-monitor cr with diuresis, possibly cardiorenal as cr improved with lasix








abnl ecg:


-lbbb likely due to sev dec lvef


-mibi shows no ischemia

## 2019-11-24 NOTE — PN.GI
GI Progress Note


Subjective: 


GI NOte: LFTs are almost normalized. NO GI complaints








- Objective


Vital Signs: 


 Vital Signs











Temperature  98 F   11/24/19 10:00


 


Pulse Rate  79   11/24/19 10:00


 


Respiratory Rate  18   11/24/19 10:00


 


Blood Pressure  132/83   11/24/19 10:00


 


O2 Sat by Pulse Oximetry (%)  100   11/23/19 21:00








 Laboratory Tests











  11/20/19 11/23/19





  06:10 07:54


 


Total Bilirubin   1.5 H


 


AST  648 H  105 H


 


ALT  1080 H  437 H


 


Alkaline Phosphatase  117  96











Constitutional: Calm


...Auscultate: Yes: Normoactive Bowel Sounds


...Palpate: Yes: Soft, Other (nontender)


Labs: 


 CBC, BMP





 11/23/19 07:54 





 11/23/19 07:54 





 INR, PTT











INR  1.20  (0.83-1.09)  H  11/23/19  07:54    














Assessment/Plan





Assessment:


-- Congestive hepatopathy which is responding to afterload reduction


-- Acute hepatitis A excluded by presence of IgG


-- Silent gallstones  . NO MRCP evidence of CBD stones or obstruction


-- Constipation due to hospital sedentary state





Plan:


-- Continue Miralax


-- Continue afterload reduction





   





Problem List





- Problems


(1) Hepatic congestion


Code(s): K76.1 - CHRONIC PASSIVE CONGESTION OF LIVER   





(2) Hepatitis A antibody positive


Code(s): R76.8 - OTHER SPECIFIED ABNORMAL IMMUNOLOGICAL FINDINGS IN SERUM   





(3) Abnormal liver enzymes


Code(s): R74.8 - ABNORMAL LEVELS OF OTHER SERUM ENZYMES   





(4) Hypertension


Code(s): I10 - ESSENTIAL (PRIMARY) HYPERTENSION   





(5) Hyperlipidemia


Code(s): E78.5 - HYPERLIPIDEMIA, UNSPECIFIED   





(6) New onset of congestive heart failure


Code(s): I50.9 - HEART FAILURE, UNSPECIFIED   





(7) Constipation


Code(s): K59.00 - CONSTIPATION, UNSPECIFIED

## 2019-11-24 NOTE — PN
Physical Exam: 


SUBJECTIVE: Patient seen and examined. She reports feeling the same as the last 

several days in terms of breathing.





OBJECTIVE:





 Vital Signs











 Period  Temp  Pulse  Resp  BP Sys/Reyes  Pulse Ox


 


 Last 24 Hr  98 F-98.0 F  77-79  18-20  125-132/77-83  100











GENERAL: The patient is awake, alert, and fully oriented, in no acute distress.


HEAD: Normal with no signs of trauma.


EYES: PERRL, extraocular movements intact, conjunctiva clear.


ENT: Ears normal, nares patent, moist mucous membranes.


NECK: Trachea midline, full range of motion, supple. 


LUNGS: Clear to auscultation


HEART: Regular rate and rhythm, no murmur appreciated


ABDOMEN: Soft, nontender, nondistended, normoactive bowel sounds, no guarding


EXTREMITIES: Warm, well-perfused, no edema. 


NEUROLOGICAL: Cranial nerves II through XII grossly intact. Normal speech


PSYCH: Normal mood, normal affect.


SKIN: Warm, dry, normal turgor

















Active Medications











Generic Name Dose Route Start Last Admin





  Trade Name Freq  PRN Reason Stop Dose Admin


 


Benzocaine/Menthol  1 each  11/20/19 20:15  11/20/19 22:06





  Cepacol Lozenge -  MM   1 each





  PRN PRN   Administration





  SORE THROAT   





     





     





     


 


Carvedilol  3.125 mg  11/15/19 22:00  11/24/19 11:15





  Coreg -  PO   3.125 mg





  BID KARLA   Administration





     





     





     





     


 


Furosemide  40 mg  11/15/19 16:00  11/24/19 11:16





  Lasix Injection -  IVPUSH   40 mg





  DAILY KARLA   Administration





     





     





     





     


 


Glycerin  1 each  11/18/19 15:18  11/18/19 18:08





  Glycerin Suppository Adult -  [place]   1 each





  DAILY PRN   Administration





  CONSTIPATION   





     





     





     


 


Heparin Sodium (Porcine)  5,000 unit  11/15/19 06:00  11/24/19 15:20





  Heparin -  SQ   Not Given





  TID KARLA   





     





     





     





     


 


Pantoprazole Sodium  40 mg  11/19/19 10:00  11/24/19 11:15





  Protonix -  PO   40 mg





  DAILY KARLA   Administration





     





     





     





     


 


Paroxetine HCl  30 mg  11/19/19 10:00  11/24/19 11:15





  Paxil -  PO   30 mg





  DAILY KARLA   Administration





     





     





     





     


 


Polyethylene Glycol  17 gm  11/19/19 22:00  11/24/19 11:11





  Miralax (For Daily Use) -  PO   Not Given





  BID KARLA   





     





     





     





     











ASSESSMENT/PLAN:


Ms. Toledo is an 87y/o female with HTN and HLD who presents with progressive 

shortness of breath and fatigue. Per daughter, the patient has become 

increasingly dyspneic on exertion over the last several months and especially 

in the last week prior to admission. Pt was found to have severe transaminitis 

and lactic acidosis. Echo showed LVH with EF 20-25% and RV pressure 40-50 with 

severe TR.





#acute HFrEF


Unknown if pt has previous hx of heart failure due to lack of follow up.


-nuclear stress test showed global hypokinesis but no ischemia


-Lasix 40mg IV


-carvedilol 3.125mg BID


-d/c planned for tomorrow- will be on lasix 40mg PO





#transaminitis possibly 2/2 congestion


continued improvement


-GI following- paroxetine dose lowered


-acute hep A negative (IgG present)


-ID following


-nephrology following


-MRI- no abnormalities of liver, gallstones present in gallbladder but no 

infection


-monitor INR- downtrending as well





#hyponatremia likely 2/2 HF


131


-water restriction





#constipation


-bowel regimen 





#HTN


-carvedilol





DVT Ppx


heparin





FEN


fluid restriction


monitor Na


Na restricted diet








Visit type





- Emergency Visit


Emergency Visit: Yes


ED Registration Date: 11/14/19


Care time: The patient presented to the Emergency Department on the above date 

and was hospitalized for further evaluation of their emergent condition.





- New Patient


This patient is new to me today: No





- Critical Care


Critical Care patient: No





- Discharge Referral


Referred to [facility] No





ATTENDING PHYSICIAN STATEMENT





I saw and evaluated the patient.


I reviewed the resident's note and discussed the case with the resident.


I agree with the resident's findings and plan as documented.








SUBJECTIVE:








OBJECTIVE:








ASSESSMENT AND PLAN:

## 2019-11-24 NOTE — PN
Progress Note (short form)





- Note


Progress Note: 








1. hyponatremia


2. chf


3. transaminitis


4. hld





 Current Medications





Benzocaine/Menthol (Cepacol Lozenge -)  1 each MM PRN PRN


   PRN Reason: SORE THROAT


   Last Admin: 11/20/19 22:06 Dose:  1 each


Carvedilol (Coreg -)  3.125 mg PO BID [facility]


   Last Admin: 11/24/19 21:31 Dose:  3.125 mg


Furosemide (Lasix Injection -)  40 mg IVPUSH DAILY [facility]


   Last Admin: 11/24/19 11:16 Dose:  40 mg


Glycerin (Glycerin Suppository Adult -)  1 each [place] DAILY PRN


   PRN Reason: CONSTIPATION


   Last Admin: 11/18/19 18:08 Dose:  1 each


Heparin Sodium (Porcine) (Heparin -)  5,000 unit SQ TID [facility]


   Last Admin: 11/24/19 21:30 Dose:  5,000 unit


Pantoprazole Sodium (Protonix -)  40 mg PO DAILY [facility]


   Last Admin: 11/24/19 11:15 Dose:  40 mg


Paroxetine HCl (Paxil -)  30 mg PO DAILY [facility]


   Last Admin: 11/24/19 11:15 Dose:  30 mg


Polyethylene Glycol (Miralax (For Daily Use) -)  17 gm PO BID [facility]


   Last Admin: 11/24/19 21:31 Dose:  17 grams





 Last Vital Signs











Temp Pulse Resp BP Pulse Ox


 


 98 F   79   18   132/83   100 


 


 11/24/19 10:00  11/24/19 10:00  11/24/19 10:00  11/24/19 10:00  11/23/19 21:00








Lungs clear


Heart reg


Abd soft


Ext no edema

















 CBC, BMP





 11/23/19 07:54 





 11/23/19 07:54 











IMP- chronic Hyponatremia


CHF/Lasix











Plan


- cnt lasix IV


- will give one more sodium of sodium


- repeat labs in am


- sodium improving


- restrict free water


- discussed with cardio


- etiology of hyponatremia likely chf 


- will follow


- will give a trial of salt tabs

## 2019-11-25 VITALS — HEART RATE: 89 BPM | DIASTOLIC BLOOD PRESSURE: 78 MMHG | TEMPERATURE: 97.4 F | SYSTOLIC BLOOD PRESSURE: 125 MMHG

## 2019-11-25 LAB
ALBUMIN SERPL-MCNC: 2.7 G/DL (ref 3.4–5)
ALP SERPL-CCNC: 97 U/L (ref 45–117)
ALT SERPL-CCNC: 289 U/L (ref 13–61)
ANION GAP SERPL CALC-SCNC: 6 MMOL/L (ref 8–16)
AST SERPL-CCNC: 84 U/L (ref 15–37)
BASOPHILS # BLD: 0.4 % (ref 0–2)
BILIRUB SERPL-MCNC: 1 MG/DL (ref 0.2–1)
BUN SERPL-MCNC: 22.8 MG/DL (ref 7–18)
CALCIUM SERPL-MCNC: 8.3 MG/DL (ref 8.5–10.1)
CHLORIDE SERPL-SCNC: 93 MMOL/L (ref 98–107)
CO2 SERPL-SCNC: 32 MMOL/L (ref 21–32)
CREAT SERPL-MCNC: 1 MG/DL (ref 0.55–1.3)
DEPRECATED RDW RBC AUTO: 15.8 % (ref 11.6–15.6)
EOSINOPHIL # BLD: 3.2 % (ref 0–4.5)
GLUCOSE SERPL-MCNC: 230 MG/DL (ref 74–106)
HCT VFR BLD CALC: 37.5 % (ref 32.4–45.2)
HGB BLD-MCNC: 11.9 GM/DL (ref 10.7–15.3)
LYMPHOCYTES # BLD: 14 % (ref 8–40)
MAGNESIUM SERPL-MCNC: 1.9 MG/DL (ref 1.8–2.4)
MCH RBC QN AUTO: 25.9 PG (ref 25.7–33.7)
MCHC RBC AUTO-ENTMCNC: 31.7 G/DL (ref 32–36)
MCV RBC: 81.8 FL (ref 80–96)
MONOCYTES # BLD AUTO: 6.1 % (ref 3.8–10.2)
NEUTROPHILS # BLD: 76.3 % (ref 42.8–82.8)
PHOSPHATE SERPL-MCNC: 2.8 MG/DL (ref 2.5–4.9)
PLATELET # BLD AUTO: 343 K/MM3 (ref 134–434)
PMV BLD: 7.7 FL (ref 7.5–11.1)
POTASSIUM SERPLBLD-SCNC: 3.8 MMOL/L (ref 3.5–5.1)
PROT SERPL-MCNC: 5.8 G/DL (ref 6.4–8.2)
RBC # BLD AUTO: 4.59 M/MM3 (ref 3.6–5.2)
SODIUM SERPL-SCNC: 131 MMOL/L (ref 136–145)
WBC # BLD AUTO: 9.2 K/MM3 (ref 4–10)

## 2019-11-25 RX ADMIN — HEPARIN SODIUM SCH UNIT: 5000 INJECTION, SOLUTION INTRAVENOUS; SUBCUTANEOUS at 05:08

## 2019-11-25 RX ADMIN — SODIUM CHLORIDE TAB 1 GM SCH GM: 1 TAB at 12:22

## 2019-11-25 RX ADMIN — CARVEDILOL SCH MG: 3.12 TABLET, FILM COATED ORAL at 10:47

## 2019-11-25 RX ADMIN — PANTOPRAZOLE SODIUM SCH MG: 40 TABLET, DELAYED RELEASE ORAL at 10:47

## 2019-11-25 RX ADMIN — POLYETHYLENE GLYCOL 3350 SCH GRAMS: 17 POWDER, FOR SOLUTION ORAL at 10:47

## 2019-11-25 RX ADMIN — FUROSEMIDE SCH MG: 10 INJECTION, SOLUTION INTRAVENOUS at 10:46

## 2019-11-25 RX ADMIN — HEPARIN SODIUM SCH UNIT: 5000 INJECTION, SOLUTION INTRAVENOUS; SUBCUTANEOUS at 16:09

## 2019-11-25 NOTE — PN
Progress Note (short form)





- Note


Progress Note: 








s: no cp palps dizzy, sob





  Vital Signs











 Period  Temp  Pulse  Resp  BP Sys/Reyes  Pulse Ox


 


 Last 24 Hr  98.4 F-98.9 F  80-82  18-20  121-130/61-79  100-100











nad no jvd


rrr s1s2 no mrg


cta bl nl ef


aao3


trace le edema bl


abd nt nd pos bs


no jaundice diaphoresis





  Current Medications





Benzocaine/Menthol (Cepacol Lozenge -)  1 each MM PRN PRN


   PRN Reason: SORE THROAT


   Last Admin: 11/20/19 22:06 Dose:  1 each


Carvedilol (Coreg -)  3.125 mg PO BID [facility]


   Last Admin: 11/25/19 10:47 Dose:  3.125 mg


Furosemide (Lasix Injection -)  40 mg IVPUSH DAILY [facility]


   Last Admin: 11/25/19 10:46 Dose:  40 mg


Glycerin (Glycerin Suppository Adult -)  1 each RI DAILY PRN


   PRN Reason: CONSTIPATION


   Last Admin: 11/18/19 18:08 Dose:  1 each


Heparin Sodium (Porcine) (Heparin -)  5,000 unit SQ TID [facility]


   Last Admin: 11/25/19 05:08 Dose:  5,000 unit


Pantoprazole Sodium (Protonix -)  40 mg PO DAILY [facility]


   Last Admin: 11/25/19 10:47 Dose:  40 mg


Paroxetine HCl (Paxil -)  30 mg PO DAILY [facility]


   Last Admin: 11/25/19 10:47 Dose:  30 mg


Polyethylene Glycol (Miralax (For Daily Use) -)  17 gm PO BID [facility]


   Last Admin: 11/25/19 10:47 Dose:  17 grams











ecg: sr lbbb





cxr: clear lungs





echo 11/2019:  lvef 20-25, global hk, nl rv, sev tr, mild lae, mod mr, rvsp 40-

50





mibi 11/2019: no ischemia








a/p:  88 f hx hld, ckd (bl 1.2), here with sob.








sob, acute systolic chf:


-no known hx hrt dz but echo here showing reduced lvef and sev tr


-seems that pt has component of vol overload with hepatic congestion causing 

elevated lfts (also possible from hep A)


-has been getting lasix 40 iv qd with improvement in cr and sob, LFTs continue 

to decrease, edema improving


-monitor daily wts, lytes


- change lasix to 40 mg PO daily


-cont coreg for chf regimen.  hold off on ace/arb until cr/lytes more stable


-mibi for ischemic evaluation was done here and showed no ischemia


- stable for dc from cardiac perspective, follow up in 1-2 weeks





hld:


-statin held 2/2 elevated lfts





yane on ckd:


-monitor cr with diuresis, possibly cardiorenal as cr improved with lasix








abnl ecg:


-lbbb likely due to sev dec lvef


-mibi shows no ischemia

## 2019-11-25 NOTE — DS
Physical Exam: 


SUBJECTIVE: Refer to attending note








OBJECTIVE:





 Vital Signs











 Period  Temp  Pulse  Resp  BP Sys/Reyes  Pulse Ox


 


 Last 24 Hr  98.4 F-98.9 F  80-82  18-20  121-130/61-79  100-100








PHYSICAL EXAM





refer to attending note





LABS


 Laboratory Results - last 24 hr











  11/25/19 11/25/19





  09:08 09:08


 


WBC   9.2


 


RBC   4.59


 


Hgb   11.9


 


Hct   37.5


 


MCV   81.8


 


MCH   25.9


 


MCHC   31.7 L


 


RDW   15.8 H


 


Plt Count   343


 


MPV   7.7


 


Absolute Neuts (auto)   7.0


 


Neutrophils %   76.3


 


Lymphocytes %   14.0


 


Monocytes %   6.1


 


Eosinophils %   3.2  D


 


Basophils %   0.4


 


Nucleated RBC %   0


 


Sodium  131 L 


 


Potassium  3.8 


 


Chloride  93 L 


 


Carbon Dioxide  32 


 


Anion Gap  6 L 


 


BUN  22.8 H 


 


Creatinine  1.0 


 


Est GFR (CKD-EPI)AfAm  58.25 


 


Est GFR (CKD-EPI)NonAf  50.26 


 


Random Glucose  230 H 


 


Calcium  8.3 L 


 


Phosphorus  2.8 


 


Magnesium  1.9 


 


Total Bilirubin  1.0 


 


AST  84 H 


 


ALT  289 H 


 


Alkaline Phosphatase  97 


 


Total Protein  5.8 L 


 


Albumin  2.7 L 











HOSPITAL COURSE:


Ms. Toledo is an 87y/o female with HTN and HLD who presents with progressive 

shortness of breath and fatigue. Per daughter, the patient has become 

increasingly dyspneic on exertion over the last several months and especially 

in the last week prior to admission. Pt was found to have severe transaminitis. 

Leukocytosis, fever, and lactic acid were present on admission but resolve. MRI 

showed gallstones but not acute cholecystitis.  Echo showed LVH with EF 20-25% 

and RV pressure 40-50 with severe TR. She was given IV Lasix with quick 

improvement in [place]. She was started on carvedilol 3.125mg BID for systolic CHF. 

Transaminitis was likely from CHF and lab values improved daily. Pt was on 

Paroxetine 40mg prior to admission. Given possibility that SSRI could be 

causing transaminitis, pt's dose was decreased, not stopped suddenly, in order 

to avoid withdrawal. Of note, she denied abdominal pain throughout course. 

Acute hepatitis A was negative. Pt was also hyponatremic, and she was placed on 

water restriction and given sodium supplementation. She was discharged on PO 

Lasix and instructed to f/u for repeat labs in one week.





Date of Admission:11/14/19





Date of Discharge: 11/25/19











Minutes to complete discharge: 35





Discharge Summary


Problems reviewed: Yes


Reason For Visit: TRANSAMINASEMIA,NEW ONSET OF CHF


Current Active Problems





Abnormal liver enzymes (Acute)


Constipation (Acute)


Hepatic congestion (Acute)


Hepatitis (Acute)


Hepatitis A antibody positive (Acute)


Hyperlipidemia (Acute)


Hypertension (Acute)


Hyponatremia (Acute)


New onset of congestive heart failure (Acute)


Transaminitis (Acute)








Condition: Stable





- Instructions


Diet, Activity, Other Instructions: 


YOUR VISIT:


You were admitted to the hospital for shortness of breath. Tests done showed 

you have heart failure. You were given medicine for excess fluid. In addition, 

your liver enzymes were elevated likely secondary to this new onset heart 

failure, and they are now down-trending. You are stable to go home.








MEDICATIONS:


STOP the following:


Losartan


Lovastatin


Fenofibrate


Amoxicillin





There is a DOSE CHANGE for Paroxetine. It is now 30mg once a day.





START the following:


Furosemide 40mg once a day


Carvedilol 3.125mg twice a day








FOLLOW UP:


Dr. Verde, primary care, in the next week. You will need your liver 

function tests repeated within one week. You will also be able to discuss your 

Paroxetine dose.


Dr. Oates, cardiology, in the next week.


Dr. Dupree, gastroenterology, in the next 2-4 weeks.








OTHER INSTRUCTIONS:


1. Weigh yourself daily.


2. Monitor your fluid intake. Do not drink more than 1 liter per day.


3. Return to the emergency room if you start feeling weak again, have chest pain

, shortness of breath, abdominal pain, nausea, or vomiting.


Referrals: 


Feliz Verde MD [Primary Care Provider] - 1 Week


Madelyn Oates MD [Staff Physician] - 1 Week


Stephan Dupree MD [Staff Physician] - 1 Month


Disposition: HOME





- Home Medications


Comprehensive Discharge Medication List: 


Ambulatory Orders





Aspirin [Children's Aspirin] 81 mg PO DAILY 11/14/19 


Zolpidem Tartrate 10 mg PO DAILY 11/14/19 


Carvedilol [Coreg -] 3.125 mg PO BID 30 Days #30 tablet 11/25/19 


Furosemide [Lasix] 40 mg PO DAILY 30 Days #30 tablet 11/25/19 


Losartan Potassium 25 mg PO DAILY #30 tablet 11/25/19 


Paroxetine HCl [Paxil -] 30 mg PO DAILY 30 Days #30 tablet 11/25/19 








This patient is new to me today: No


Emergency Visit: Yes


ED Registration Date: 11/14/19


Care time: The patient presented to the Emergency Department on the above date 

and was hospitalized for further evaluation of their emergent condition.


Critical Care patient: No





- Discharge Referral


Referred to [facility] No





ATTENDING PHYSICIAN STATEMENT





I saw and evaluated the patient.


I reviewed the resident's note and discussed the case with the resident.


I agree with the resident's findings and plan as documented.








SUBJECTIVE:








OBJECTIVE:








ASSESSMENT AND PLAN:

## 2019-11-25 NOTE — PN
Teaching Attending Note


Name of Resident: Hetal Sommers





ATTENDING PHYSICIAN STATEMENT





I saw and evaluated the patient.


I reviewed the resident's note and discussed the case with the resident.


I agree with the resident's findings and plan as documented.








SUBJECTIVE:


Patient is feeling better, daughter at bedside. no chest pain or shortness of 

breath or palpitations. 


OBJECTIVE:


 Vital Signs











Temperature  98.9 F   11/25/19 14:30


 


Pulse Rate  82   11/25/19 14:30


 


Respiratory Rate  20   11/25/19 14:30


 


Blood Pressure  121/61   11/25/19 14:30


 


O2 Sat by Pulse Oximetry (%)  100   11/25/19 09:00








GENERAL: The patient is awake, alert, and fully oriented, in no acute distress.


HEAD: Normal with no signs of trauma.


EYES: PERRL, extraocular movements intact, sclera anicteric, conjunctiva clear.

  


ENT: Ears normal,  oropharynx clear without exudates, moist mucous membranes.


NECK: Trachea midline, full range of motion, supple. 


LUNGS: Breath sounds equal, clear to auscultation bilaterally, no wheezes, no 

crackles, no accessory muscle use. 


HEART: Regular rate and rhythm, S1, S2 positive, DAVID 3/6 LSB , no rub or gallop.


ABDOMEN: Soft, NT,ND, normoactive bowel sounds, no guarding, no rebound, no 

hepatosplenomegaly, no masses.


EXTREMITIES: 2+ pulses, warm, well-perfused, no edema. 


NEUROLOGICAL: Cranial nerves II through XII grossly intact. Normal speech, gait 

not observed.


PSYCH: Normal mood, normal affect.


SKIN: Warm, dry, normal turgor, no rashes or lesions noted                 CBCD











WBC  9.2 K/mm3 (4.0-10.0)   11/25/19  09:08    


 


RBC  4.59 M/mm3 (3.60-5.2)   11/25/19  09:08    


 


Hgb  11.9 GM/dL (10.7-15.3)   11/25/19  09:08    


 


Hct  37.5 % (32.4-45.2)   11/25/19  09:08    


 


MCV  81.8 fl (80-96)   11/25/19  09:08    


 


MCHC  31.7 g/dl (32.0-36.0)  L  11/25/19  09:08    


 


RDW  15.8 % (11.6-15.6)  H  11/25/19  09:08    


 


Plt Count  343 K/MM3 (134-434)   11/25/19  09:08    


 


MPV  7.7 fl (7.5-11.1)   11/25/19  09:08    








 CMP











Sodium  131 mmol/L (136-145)  L  11/25/19  09:08    


 


Potassium  3.8 mmol/L (3.5-5.1)   11/25/19  09:08    


 


Chloride  93 mmol/L ()  L  11/25/19  09:08    


 


Carbon Dioxide  32 mmol/L (21-32)   11/25/19  09:08    


 


Anion Gap  6 MMOL/L (8-16)  L  11/25/19  09:08    


 


BUN  22.8 mg/dL (7-18)  H  11/25/19  09:08    


 


Creatinine  1.0 mg/dL (0.55-1.3)   11/25/19  09:08    


 


Random Glucose  230 mg/dL ()  H  11/25/19  09:08    


 


Calcium  8.3 mg/dL (8.5-10.1)  L  11/25/19  09:08    


 


Total Bilirubin  1.0 mg/dL (0.2-1)   11/25/19  09:08    


 


AST  84 U/L (15-37)  H  11/25/19  09:08    


 


ALT  289 U/L (13-61)  H  11/25/19  09:08    


 


Alkaline Phosphatase  97 U/L ()   11/25/19  09:08    


 


Total Protein  5.8 g/dl (6.4-8.2)  L  11/25/19  09:08    


 


Albumin  2.7 g/dl (3.4-5.0)  L  11/25/19  09:08    








 CARDIAC ENZYMES











Creatine Kinase  158 U/L ()   11/14/19  14:20    


 


Troponin I  0.04 ng/ml (0.00-0.05)   11/14/19  14:20    








 Current Medications











Generic Name Dose Route Start Last Admin





  Trade Name Freq  PRN Reason Stop Dose Admin


 


Benzocaine/Menthol  1 each  11/20/19 20:15  11/20/19 22:06





  Cepacol Lozenge -  MM   1 each





  PRN PRN   Administration





  SORE THROAT   





     





     





     


 


Carvedilol  3.125 mg  11/15/19 22:00  11/25/19 10:47





  Coreg -  PO   3.125 mg





  BID KARLA   Administration





     





     





     





     


 


Furosemide  40 mg  11/15/19 16:00  11/25/19 10:46





  Lasix Injection -  IVPUSH   40 mg





  DAILY KARLA   Administration





     





     





     





     


 


Glycerin  1 each  11/18/19 15:18  11/18/19 18:08





  Glycerin Suppository Adult -  NE   1 each





  DAILY PRN   Administration





  CONSTIPATION   





     





     





     


 


Heparin Sodium (Porcine)  5,000 unit  11/15/19 06:00  11/25/19 05:08





  Heparin -  SQ   5,000 unit





  TID KARLA   Administration





     





     





     





     


 


Pantoprazole Sodium  40 mg  11/19/19 10:00  11/25/19 10:47





  Protonix -  PO   40 mg





  DAILY KARLA   Administration





     





     





     





     


 


Paroxetine HCl  30 mg  11/19/19 10:00  11/25/19 10:47





  Paxil -  PO   30 mg





  DAILY KARLA   Administration





     





     





     





     


 


Polyethylene Glycol  17 gm  11/19/19 22:00  11/25/19 10:47





  Miralax (For Daily Use) -  PO   17 grams





  BID KARLA   Administration





     





     





     





     








  


 Home Medications











 Medication  Instructions  Recorded


 


RX: Aspirin [Children's Aspirin] 81 mg PO DAILY 11/14/19


 


RX: Zolpidem Tartrate 10 mg PO DAILY 11/14/19


 


Furosemide [Lasix] 40 mg PO DAILY 30 Days #30 tablet 11/25/19


 


RX: Carvedilol [Coreg -] 3.125 mg PO BID 30 Days #30 tablet 11/25/19


 


RX: Losartan Potassium 25 mg PO DAILY #30 tablet 11/25/19


 


RX: Paroxetine HCl [Paxil -] 30 mg PO DAILY 30 Days #30 tablet 11/25/19








Nuclear stress test: severe hypokineses (11/22/2019) 





ASSESSMENT AND PLAN:


Patient is an 89yo female with Pmhx of HTN, HLP, Hysterectomy who presented 

with fever, chills x 1 -2 weeks and was found to have elevated LFts. 





# New onset acute systolic heart failure: with severely reduced EF. discussed 

with cardiologist , will continue lasix po 40mg po daily. continue coreg , 

follow with dr. Oates closely within a week period. patient has an EF is 20-25%

, will need R/L heart cath at some point , hold ACE/ARB for now. 





# Acute transaminitis: improving due to congestive hepatopathy improving ; cont 

supportive measures , FOLLOW WITH GI AS AN OUTPATIENT. 


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 d/w ID , will  cont to hold statin and fibrates, 

cont Paroxitine at a lower dose 30. 





# MASSIEL: probably prerenal azotemia ( cardio-renal sx ) , improved , continue 

diuresing,  discussed with nephro to place her back to losartan 25mg po daily.





# Acute hyponatremia: improving 127--> 131-->131 today , s/p  sodium tablets x 

2 doses as per nephro. 





# hypophos: repleted 2.8 now 





# H/o HTN: continue losartan reduced the dose to 25mg 





DVT PX : heparin SQ

## 2019-11-25 NOTE — PN
Progress Note, Physician


History of Present Illness: 





Pt seen and examined at bedside. She is awake and alert. She denies shortness 

of breath. 





- Current Medication List


Current Medications: 


Active Medications





Benzocaine/Menthol (Cepacol Lozenge -)  1 each MM PRN PRN


   PRN Reason: SORE THROAT


   Last Admin: 11/20/19 22:06 Dose:  1 each


Carvedilol (Coreg -)  3.125 mg PO BID [facility]


   Last Admin: 11/25/19 10:47 Dose:  3.125 mg


Furosemide (Lasix Injection -)  40 mg IVPUSH DAILY [facility]


   Last Admin: 11/25/19 10:46 Dose:  40 mg


Glycerin (Glycerin Suppository Adult -)  1 each [place] DAILY PRN


   PRN Reason: CONSTIPATION


   Last Admin: 11/18/19 18:08 Dose:  1 each


Heparin Sodium (Porcine) (Heparin -)  5,000 unit SQ TID [facility]


   Last Admin: 11/25/19 05:08 Dose:  5,000 unit


Pantoprazole Sodium (Protonix -)  40 mg PO DAILY [facility]


   Last Admin: 11/25/19 10:47 Dose:  40 mg


Paroxetine HCl (Paxil -)  30 mg PO DAILY [facility]


   Last Admin: 11/25/19 10:47 Dose:  30 mg


Polyethylene Glycol (Miralax (For Daily Use) -)  17 gm PO BID [facility]


   Last Admin: 11/25/19 10:47 Dose:  17 grams


Sodium Chloride (Sodium Chloride Tablet -)  0.5 gm PO BID [facility]


   Stop: 11/26/19 22:29


   Last Admin: 11/25/19 12:22 Dose:  0.5 gm











- Objective


Vital Signs: 


 Vital Signs











Temperature  98.4 F   11/24/19 22:00


 


Pulse Rate  80   11/24/19 22:00


 


Respiratory Rate  18   11/24/19 22:00


 


Blood Pressure  130/79   11/24/19 22:00


 


O2 Sat by Pulse Oximetry (%)  100   11/24/19 21:00











Constitutional: Yes: Calm


Eyes: Yes: Conjunctiva Clear


HENT: Yes: Atraumatic


Neck: Yes: Supple


Cardiovascular: Yes: S1, S2


Respiratory: Yes: CTA Bilaterally


Gastrointestinal: Yes: Soft


Genitourinary: Yes: WNL


Musculoskeletal: Yes: WNL


Edema: No


Neurological: Yes: Oriented


Psychiatric: Yes: Oriented


Labs: 


 CBC, BMP





 11/25/19 09:08 





 11/25/19 09:08 





 INR, PTT











INR  1.20  (0.83-1.09)  H  11/23/19  07:54    














Problem List





- Problems


(1) Hyponatremia


Code(s): E87.1 - HYPO-OSMOLALITY AND HYPONATREMIA   





(2) Abnormal liver enzymes


Code(s): R74.8 - ABNORMAL LEVELS OF OTHER SERUM ENZYMES   





(3) New onset of congestive heart failure


Code(s): I50.9 - HEART FAILURE, UNSPECIFIED   





Assessment/Plan


 Current Medications











Generic Name Dose Route Start Last Admin





  Trade Name Freq  PRN Reason Stop Dose Admin


 


Benzocaine/Menthol  1 each  11/20/19 20:15  11/20/19 22:06





  Cepacol Lozenge -  MM   1 each





  PRN PRN   Administration





  SORE THROAT   





     





     





     


 


Carvedilol  3.125 mg  11/15/19 22:00  11/25/19 10:47





  Coreg -  PO   3.125 mg





  BID KARLA   Administration





     





     





     





     


 


Furosemide  40 mg  11/15/19 16:00  11/25/19 10:46





  Lasix Injection -  IVPUSH   40 mg





  DAILY KARLA   Administration





     





     





     





     


 


Glycerin  1 each  11/18/19 15:18  11/18/19 18:08





  Glycerin Suppository Adult -  [place]   1 each





  DAILY PRN   Administration





  CONSTIPATION   





     





     





     


 


Heparin Sodium (Porcine)  5,000 unit  11/15/19 06:00  11/25/19 05:08





  Heparin -  SQ   5,000 unit





  TID KARLA   Administration





     





     





     





     


 


Pantoprazole Sodium  40 mg  11/19/19 10:00  11/25/19 10:47





  Protonix -  PO   40 mg





  DAILY KARLA   Administration





     





     





     





     


 


Paroxetine HCl  30 mg  11/19/19 10:00  11/25/19 10:47





  Paxil -  PO   30 mg





  DAILY KARLA   Administration





     





     





     





     


 


Polyethylene Glycol  17 gm  11/19/19 22:00  11/25/19 10:47





  Miralax (For Daily Use) -  PO   17 grams





  BID KARLA   Administration





     





     





     





     


 


Sodium Chloride  0.5 gm  11/24/19 22:30  11/25/19 12:22





  Sodium Chloride Tablet -  PO  11/26/19 22:29  0.5 gm





  BID KARLA   Administration





     





     





     





     

















Impression


1. hyponatremia


2. chf


3. transaminitis


4. hld





Plan


- cont lasix


- restrict free water


- can stop sodium tabs for now


- will need outpt follow up


- heart healthy diet


- etiology of hyponatremia likely chf